# Patient Record
Sex: FEMALE | Race: WHITE | Employment: UNEMPLOYED | URBAN - METROPOLITAN AREA
[De-identification: names, ages, dates, MRNs, and addresses within clinical notes are randomized per-mention and may not be internally consistent; named-entity substitution may affect disease eponyms.]

---

## 2017-02-21 ENCOUNTER — OFFICE VISIT (OUTPATIENT)
Dept: PULMONOLOGY | Age: 77
End: 2017-02-21

## 2017-02-21 VITALS
WEIGHT: 147 LBS | OXYGEN SATURATION: 96 % | SYSTOLIC BLOOD PRESSURE: 124 MMHG | BODY MASS INDEX: 23.63 KG/M2 | DIASTOLIC BLOOD PRESSURE: 68 MMHG | RESPIRATION RATE: 18 BRPM | TEMPERATURE: 97.9 F | HEIGHT: 66 IN | HEART RATE: 79 BPM

## 2017-02-21 DIAGNOSIS — J45.40 ALLERGIC ASTHMA, MODERATE PERSISTENT, UNCOMPLICATED: ICD-10-CM

## 2017-02-21 DIAGNOSIS — J41.1 CHRONIC BRONCHITIS, MUCOPURULENT (HCC): Primary | ICD-10-CM

## 2017-02-21 DIAGNOSIS — K21.9 GASTROESOPHAGEAL REFLUX DISEASE WITHOUT ESOPHAGITIS: ICD-10-CM

## 2017-02-21 RX ORDER — ACETAMINOPHEN 325 MG/1
TABLET ORAL
COMMUNITY

## 2017-02-21 RX ORDER — AZITHROMYCIN 250 MG/1
250 TABLET, FILM COATED ORAL DAILY
Qty: 6 TAB | Refills: 0 | Status: SHIPPED | OUTPATIENT
Start: 2017-02-21 | End: 2017-02-26

## 2017-02-21 RX ORDER — PREDNISONE 10 MG/1
TABLET ORAL
Qty: 18 TAB | Refills: 0 | Status: SHIPPED | OUTPATIENT
Start: 2017-02-21 | End: 2017-03-23 | Stop reason: SINTOL

## 2017-02-21 RX ORDER — DIPHENHYDRAMINE HCL 25 MG
25 CAPSULE ORAL
COMMUNITY

## 2017-02-21 NOTE — PROGRESS NOTES
1/17 CT Chest  IMPRESSION:    1. No enlarged lymph nodes or other evidence of residual/recurrent lymphoma. 2. Few tiny right lower lobe pulmonary nodules, not visible previously, though possibly reflecting mucous plugs. Suggest 3 month followup chest CT to further evaluate and assess stability. 3. Generalized bronchial wall thickening, likely reflect bronchitis or asthma. 4. Septated hepatic cyst. Additional tiny hepatic lesions are too small to characterize, though were present previously and are not appreciably changed in size.

## 2017-02-21 NOTE — MR AVS SNAPSHOT
Visit Information Date & Time Provider Department Dept. Phone Encounter #  
 2/21/2017 11:30 AM Saundra Mary MD [facility] [id number] Follow-up Instructions Return in about 4 weeks (around 3/21/2017). Your Appointments 3/23/2017  9:45 AM  
Follow Up with Saundra Mary MD  
[address] ([place]) Appt Note: FROM 2/21/17  
 [address] [place] [address], [address] [place] [id number]  
[phone]  
  
   
 [address] [place] [address], [address] [facility] Formerly [facility] [address] Upcoming Health Maintenance Date Due DTaP/Tdap/Td series (1 - Tdap) 6/17/1959 ZOSTER VACCINE AGE 60> 6/17/1998 GLAUCOMA SCREENING Q2Y 6/17/2003 OSTEOPOROSIS SCREENING (DEXA) 6/17/2003 MEDICARE YEARLY EXAM 6/17/2003 Pneumococcal 65+ Low/Medium Risk (2 of 2 - PPSV23) 10/18/2017 Allergies as of 2/21/2017  Review Complete On: 2/21/2017 By: Saundra Mary MD  
  
 Severity Noted Reaction Type Reactions Influenza Virus Vaccine, Specific  02/26/2013    Unable to Obtain Morphine  02/26/2013    Unable to Obtain Current Immunizations  Never Reviewed No immunizations on file. Not reviewed this visit You Were Diagnosed With   
  
 Codes Comments Chronic bronchitis, mucopurulent ([facility] [address]    -  Primary ICD-10-CM: J41.1 ICD-9-CM: 491.1 Vitals BP Pulse Temp Resp Height(growth percentile) Weight(growth percentile) 124/68 (BP 1 Location: Left arm, BP Patient Position: At rest) 79 97.9 °F (36.6 °C) 18 5' 6\" (1.676 m) 147 lb (66.7 kg) LMP SpO2 BMI Smoking Status (Exact Date) 96% 23.73 kg/m2 Never Smoker Vitals History BMI and BSA Data Body Mass Index Body Surface Area  
 23.73 kg/m 2 1.76 m 2 Your Updated Medication List  
  
   
This list is accurate as of: 2/21/17 12:35 PM.  Always use your most recent med list.  
  
  
  
  
 ADVAIR DISKUS 100-50 mcg/dose diskus inhaler Generic drug:  fluticasone-salmeterol Take  by inhalation. azithromycin 250 mg tablet Commonly known as:  [place] Ping Take 1 Tab by mouth daily for 5 days. BENADRYL 25 mg capsule Generic drug:  diphenhydrAMINE Take 25 mg by mouth every six (6) hours as needed. COZAAR 100 mg tablet Generic drug:  losartan Take  by mouth. CRESTOR 10 mg tablet Generic drug:  rosuvastatin Take  by mouth. NexIUM 20 mg capsule Generic drug:  esomeprazole Take  by mouth. PAXIL 10 mg tablet Generic drug:  PARoxetine Take  by mouth.  
  
 predniSONE 10 mg tablet Commonly known as:  DELTASONE  
30 mg po dailyx 3 days,20 mg po daily x 3 days,10 mg po daily x 3 days PROVENTIL HFA 90 mcg/actuation inhaler Generic drug:  albuterol Take  by inhalation. SINGULAIR 10 mg tablet Generic drug:  montelukast  
Take  by mouth. SPIRIVA WITH HANDIHALER 18 mcg inhalation capsule Generic drug:  tiotropium TESSALON PERLES 100 mg capsule Generic drug:  benzonatate Take 100 mg by mouth three (3) times daily as needed for Cough. traMADol 50 mg tablet Commonly known as:  ULTRAM  
Take  by mouth. TYLENOL 325 mg tablet Generic drug:  acetaminophen Take  by mouth every four (4) hours as needed for Pain. Prescriptions Printed Refills  
 azithromycin (ZITHROMAX) 250 mg tablet 0 Sig: Take 1 Tab by mouth daily for 5 days. Class: Print Route: Oral  
 predniSONE (DELTASONE) 10 mg tablet 0 Si mg po dailyx 3 days,20 mg po daily x 3 days,10 mg po daily x 3 days Class: Print We Performed the Following SPUTUM CYTOLOGY K552141 CPT(R)] Follow-up Instructions Return in about 4 weeks (around 3/21/2017). To-Do List   
 2017 Microbiology:  AFB CULTURE + SMEAR W/RFLX ID FROM CULTURE   
  
 2017 Microbiology:  CULTURE, RESPIRATORY/SPUTUM/BRONCH W GRAM STAIN Introducing [facility]! Malgorzata Ty introduces xTurion patient portal. Now you can access parts of your medical record, email your doctor's office, and request medication refills online. 1. In your internet browser, go to [email or web address] 2. Click on the First Time User? Click Here link in the Sign In box. You will see the New Member Sign Up page. 3. Enter your xTurion Access Code exactly as it appears below. You will not need to use this code after youve completed the sign-up process. If you do not sign up before the expiration date, you must request a new code. · xTurion Access Code: [email or web address] Expires: 5/22/2017 12:23 PM 
 
4. Enter the last four digits of your Social Security Number (xxxx) and Date of Birth (mm/dd/yyyy) as indicated and click Submit. You will be taken to the next sign-up page. 5. Create a xTurion ID. This will be your xTurion login ID and cannot be changed, so think of one that is secure and easy to remember. 6. Create a xTurion password. You can change your password at any time. 7. Enter your Password Reset Question and Answer. This can be used at a later time if you forget your password. 8. Enter your e-mail address. You will receive e-mail notification when new information is available in [address] 9. Click Sign Up. You can now view and download portions of your medical record. 10. Click the Download Summary menu link to download a portable copy of your medical information. If you have questions, please visit the Frequently Asked Questions section of the xTurion website. Remember, xTurion is NOT to be used for urgent needs. For medical emergencies, dial 911. Now available from your iPhone and Android! Please provide this summary of care documentation to your next provider. Your primary care clinician is listed as Mik Araceli.  If you have any questions after today's visit, please call [phone].

## 2017-02-21 NOTE — LETTER
2017 1:45 PM 
 
Patient:  Ry Gatica YOB: 1940 Date of Visit: 2017 Dear Saurav Lilly MD 
[address] [facility] [address] [id number] [address] [place] [address] VIA Facsimile: [phone] 
 : 
 
 
Thank you for referring Ms. María Barahona to me for evaluation/treatment. Below are the relevant portions of my assessment and plan of care. [facility] Pulmonary, Critical Care, and Sleep Medicine Pulmonary Office visit Name: Ry Gatica : 1940 Date: 2017 Subjective:  
 
Patient is a 64 y.o. female who presents for evaluation of chronic cough 
 
17 Continues to have daily cough, productive of yellow thick mucus. Has post nasal drip. Takes benadryl at bedtime Denies any fever, chills. Ct chest done at [place] and has brought in the disc for review. Denies any other symptoms. Had blood work. HPI: 
Patient with history of Non-Hodgkin Lymphoma in . Recevied -16, Cytoxin, and Prednisone treatments initially Continues to be followed by Dr. Santi Fowler-Oncology continues to receive Rituxan every 6 months Patient states was ill a few weeks ago, received a solumedrol injection and symptoms have subsided Patient has c/o chronic cough for over a year Cough initially started as a dry cough, but now productive with yellow-green sputum Patient currently taking Tessalon pearls to help with sleep at night Patient has a history of seasonal allergies, and was receiving allergy shots for several years, but stopped due to lack of symptom improvement Using Advair, Singular, and Albuterol Denies fever, chills, hemoptysis Pt did have one episode of fever while on a cruise in 2016 Pt does have history of GERD and currently taking Nexium Pt uses walker for stability, has had a few falls in the past and had rib fractures Pt c/o joint pain, mainly in the knees 4 years ago she had presumed parainfluenza vial infection with with 6 months of slowly resolved cough  negative FOB 2009 .   
HHN and Spiriva added  - now on advair and prn albuterol She continue long term Rituxan for NHL  q 6 month Reported nl immunoglobulins in [place] ]   
??\" Wall of esophagus involved\" and swallow is not always normal?? 
 
 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  diphenhydrAMINE (BENADRYL) 25 mg capsule Take 25 mg by mouth every six (6) hours as needed.  albuterol (PROVENTIL HFA) 90 mcg/actuation inhaler Take  by inhalation.  esomeprazole (NEXIUM) 20 mg capsule Take  by mouth.  fluticasone-salmeterol (ADVAIR DISKUS) 100-50 mcg/dose diskus inhaler Take  by inhalation.  losartan (COZAAR) 100 mg tablet Take  by mouth.  montelukast (SINGULAIR) 10 mg tablet Take  by mouth.  rosuvastatin (CRESTOR) 10 mg tablet Take  by mouth.  traMADol (ULTRAM) 50 mg tablet Take  by mouth.  benzonatate (TESSALON PERLES) 100 mg capsule Take 100 mg by mouth three (3) times daily as needed for Cough.  PARoxetine (PAXIL) 10 mg tablet Take  by mouth.  tiotropium (SPIRIVA WITH HANDIHALER) 18 mcg inhalation capsule Review of Systems: 
HEENT: No epistaxis, no nasal drainage, no difficulty in swallowing, no redness in eyes Respiratory: as above Cardiovascular: no chest pain, no palpitations, no chronic leg edema, no syncope Gastrointestinal: no abd pain, no vomiting, no diarrhea, no bleeding symptoms Genitourinary: No urinary symptoms or hematuria Integument/breast: No ulcers or rashes Musculoskeletal:Neg 
Neurological: No focal weakness, no seizures, no headaches Behvioral/Psych: No anxiety, no depression Constitutional: No fever, no chills, no weight loss, no night sweats Objective:  
 
Visit Vitals  /68 (BP 1 Location: Left arm, BP Patient Position: At rest)  Pulse 79  Temp 97.9 °F (36.6 °C)  Resp 18  Ht 5' 6\" (1.676 m)  Wt 66.7 kg (147 lb)  LMP  (Exact Date)  SpO2 96%  BMI 23.73 kg/m2 Physical Exam:  
General: comfortable, no acute distress HEENT: pupils reactive, sclera anicteric, EOM intact Neck: No adenopathy or thyroid swelling, no lymphadenopathy or JVD, supple CVS: S1S2 no murmurs RS: Mod AE bilaterally, no tactile fremitus or egophony, no accessory muscle use Abd: soft, non tender, no hepatosplenomegaly Neuro: non focal, awake, alert Extrm: no leg edema, clubbing or cyanosis Skin: no rash Data review:  
 
Spirometry W/Bronchodilator (Pre/Post)2/26/2016 [employer] Component Name Value Ref Range FVC-Pre 2 L  
FVC-%Pred-Pre 67 % FEV1-Pre 1.43 L FEV1-%Pred-Pre 64 % FEV1-Post 1.77 L FEV1-%Change-Post 23 % FEV1FVC-Pre 72 % FEFMax-Pre 3.69 L/sec FEFMax-Post 5.57 L/sec FEFMax-%Change-Post 50 % [id number]-%Pred Pre 59 % MVV-Pre 35 L/min MVV-%Pred-Pre 41 % DLCOCOR-Pre 7.82 ml/min/mmHg DLCOCOR-Pred-Pre 29 % DLVA-%Pred-Pre 72 % Result Narrative Adequate tracing and effort.  The technician reported that the results of this tests do not meet standards for reproducibility in regards to DLCO.  Caution with interpretation.  Spirometry does meet criteria for acceptability and reproducibility.   
SPIROMETRY: No airflow obstruction by criteria, though there is scooping on the flow-volume loop which could represent obstructive disease.  The FEV1 and FVC are moderately reduced in a pattern consistent with a restrictive ventilatory abnormality.  There is a significant response to bronchodilator administration.   
DLCO: 
Severely decreased diffusing capacity.  The reduction in DLCO may be partially explained by the reduced lung volumes as measured by single breath VA determination and normal DL/VA ratio. CONCLUSIONS:  Spirometry with significant reversibility that normalizes lung volumes.  This is most consistent with asthma. Imaging: 
I have personally reviewed the patients radiographs and have reviewed the reports: 
CT scan chest- 1/2016 [facility]: CHEST: 
 
LUNGS: There are several very small pulmonary nodules bilaterally, similar to prior. No highly suspicious nodule. No other abnormal opacities. PLEURA: Normal, with no effusion or pneumothorax. AIRWAY: Scattered foci of bronchial mucoid impaction, most pronounced in the medial basal left lower lobe. MEDIASTINUM: Normal heart size. No pericardial fluid. Scattered coronary and aortic calcifications. LYMPH NODES: No enlarged lymph nodes. IMPRESSION:  
· Cough Variant Asthma- with mild intermittent well controlled symptoms. Her NHL and therapy can induced her cough /bronchits threshold but I see no evidence of atypical infections   
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 ? Atypical microbial pathogens, ? Allergic ( 9.8% eosinophilia on CBC · Abnormal PFT\"s with obstructive airways defect and reduced DLCO- ? Related to previous chemotherapy. · Non hodgkins lymphoma-,2000,Stage IV, CVP 11/14/2008 on maintenance Rituxiban · GERD 
    
  
RECOMMENDATIONS:  
· Continue Singulair , Advair and prn albuterol · Will check sputum culture, AFB and cytology · Empiric treatment with Prednisone taper and Zithromax pending cultures · Judicious cough suppressants · Discussed need for airway clearance · Discussed PFT findings- progressive declining DLCO 
 · Discussed CT findings- reassured · GERD control · Monitor for super infections ( on rituxiban) · Follow symptoms and aim for quality of life · Follow up in 4 weeks. Health maintenance screens deferred to Primary care provider. Mike Spring MD 
 
 
 
 
 
 
If you have questions, please do not hesitate to call me. I look forward to following Ms. Carlos along with you.  
 
 
 
Sincerely, 
 
 
Mike Spring MD

## 2017-02-21 NOTE — PROGRESS NOTES
[facility]  Pulmonary, Critical Care, and Sleep Medicine      Pulmonary Office visit    Name: Nita Huntley     : 1940     Date: 2017        Subjective:     Patient is a 64 y.o. female who presents for evaluation of chronic cough    17   Continues to have daily cough, productive of yellow thick mucus. Has post nasal drip. Takes benadryl at bedtime  Denies any fever, chills. Ct chest done at [facility] and has brought in the disc for review. Denies any other symptoms. Had blood work. HPI:  Patient with history of Non-Hodgkin Lymphoma in .  Recevied -16, Cytoxin, and Prednisone treatments initially   Continues to be followed by Dr. Nicol Fowler-Oncology continues to receive Rituxan every 6 months  Patient states was ill a few weeks ago, received a solumedrol injection and symptoms have subsided  Patient has c/o chronic cough for over a year  Cough initially started as a dry cough, but now productive with yellow-green sputum  Patient currently taking Tessalon pearls to help with sleep at night   Patient has a history of seasonal allergies, and was receiving allergy shots for several years, but stopped due to lack of symptom improvement  Using Advair, Singular, and Albuterol  Denies fever, chills, hemoptysis  Pt did have one episode of fever while on a cruise in 2016  Pt does have history of GERD and currently taking Nexium  Pt uses walker for stability, has had a few falls in the past and had rib fractures  Pt c/o joint pain, mainly in the knees  4 years ago she had presumed parainfluenza vial infection with with 6 months of slowly resolved cough  negative FOB  .    HHN and Spiriva added  - now on advair and prn albuterol  She continue long term Rituxan for NHL  q 6 month    Reported nl immunoglobulins in [place] ]    ??\" Wall of esophagus involved\" and swallow is not always normal??     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  diphenhydrAMINE (BENADRYL) 25 mg capsule Take 25 mg by mouth every six (6) hours as needed.  albuterol (PROVENTIL HFA) 90 mcg/actuation inhaler Take  by inhalation.  esomeprazole (NEXIUM) 20 mg capsule Take  by mouth.  fluticasone-salmeterol (ADVAIR DISKUS) 100-50 mcg/dose diskus inhaler Take  by inhalation.  losartan (COZAAR) 100 mg tablet Take  by mouth.  montelukast (SINGULAIR) 10 mg tablet Take  by mouth.  rosuvastatin (CRESTOR) 10 mg tablet Take  by mouth.  traMADol (ULTRAM) 50 mg tablet Take  by mouth.  benzonatate (TESSALON PERLES) 100 mg capsule Take 100 mg by mouth three (3) times daily as needed for Cough.  PARoxetine (PAXIL) 10 mg tablet Take  by mouth.       tiotropium (SPIRIVA WITH HANDIHALER) 18 mcg inhalation capsule            Review of Systems:  HEENT: No epistaxis, no nasal drainage, no difficulty in swallowing, no redness in eyes  Respiratory: as above  Cardiovascular: no chest pain, no palpitations, no chronic leg edema, no syncope  Gastrointestinal: no abd pain, no vomiting, no diarrhea, no bleeding symptoms  Genitourinary: No urinary symptoms or hematuria  Integument/breast: No ulcers or rashes  Musculoskeletal:Neg  Neurological: No focal weakness, no seizures, no headaches  Behvioral/Psych: No anxiety, no depression  Constitutional: No fever, no chills, no weight loss, no night sweats     Objective:     Visit Vitals    /68 (BP 1 Location: Left arm, BP Patient Position: At rest)    Pulse 79    Temp 97.9 °F (36.6 °C)    Resp 18    Ht 5' 6\" (1.676 m)    Wt 66.7 kg (147 lb)    LMP  (Exact Date)    SpO2 96%    BMI 23.73 kg/m2        Physical Exam:   General: comfortable, no acute distress  HEENT: pupils reactive, sclera anicteric, EOM intact  Neck: No adenopathy or thyroid swelling, no lymphadenopathy or JVD, supple  CVS: S1S2 no murmurs  RS: Mod AE bilaterally, no tactile fremitus or egophony, no accessory muscle use  Abd: soft, non tender, no hepatosplenomegaly  Neuro: non focal, awake, alert  Extrm: no leg edema, clubbing or cyanosis  Skin: no rash    Data review:     Spirometry W/Bronchodilator (Pre/Post)2/26/2016  [employer]  Component Name Value Ref Range   FVC-Pre 2 L   FVC-%Pred-Pre 67 %   FEV1-Pre 1.43 L   FEV1-%Pred-Pre 64 %   FEV1-Post 1.77 L   FEV1-%Change-Post 23 %   FEV1FVC-Pre 72 %   FEFMax-Pre 3.69 L/sec    FEFMax-Post 5.57 L/sec    FEFMax-%Change-Post 50 %   [id number]-%Pred Pre 59 %   MVV-Pre 35 L/min   MVV-%Pred-Pre 41 %   DLCOCOR-Pre 7.82 ml/min/mmHg    DLCOCOR-Pred-Pre 29 %   DLVA-%Pred-Pre 72 %   Result Narrative   Adequate tracing and effort. The technician reported that the results of this tests do not meet standards for reproducibility in regards to DLCO.  Caution with interpretation.  Spirometry does meet criteria for acceptability and reproducibility.    SPIROMETRY: No airflow obstruction by criteria, though there is scooping on the flow-volume loop which could represent obstructive disease.  The FEV1 and FVC are moderately reduced in a pattern consistent with a restrictive ventilatory abnormality.  There is a significant response to bronchodilator administration.    DLCO:  Severely decreased diffusing capacity.  The reduction in DLCO may be partially explained by the reduced lung volumes as measured by single breath VA determination and normal DL/VA ratio. CONCLUSIONS:  Spirometry with significant reversibility that normalizes lung volumes.  This is most consistent with asthma.        Imaging:  I have personally reviewed the patients radiographs and have reviewed the reports:  CT scan chest- 1/2016 [facility]:  CHEST:    LUNGS: There are several very small pulmonary nodules bilaterally, similar to prior. No highly suspicious nodule. No other abnormal opacities. PLEURA: Normal, with no effusion or pneumothorax. AIRWAY: Scattered foci of bronchial mucoid impaction, most pronounced in the medial basal left lower lobe. MEDIASTINUM: Normal heart size. No pericardial fluid. Scattered coronary and aortic calcifications. LYMPH NODES: No enlarged lymph nodes. IMPRESSION:   · Cough Variant Asthma- with mild intermittent well controlled symptoms. 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 ? Atypical microbial pathogens, ? Allergic ( 9.8% eosinophilia on CBC  · Abnormal PFT\"s with obstructive airways defect and reduced DLCO- ? Related to previous chemotherapy. · Non hodgkins lymphoma-,2000,Stage IV, CVP 11/14/2008 on maintenance Rituxiban  · GERD          RECOMMENDATIONS:   · Continue Singulair , Advair and prn albuterol  · Will check sputum culture, AFB and cytology  · Empiric treatment with Prednisone taper and Zithromax pending cultures  · Judicious cough suppressants  · Discussed need for airway clearance  · Discussed PFT findings- progressive declining DLCO  · Discussed CT findings- reassured  · GERD control  · Monitor for super infections ( on rituxiban)  · Follow symptoms and aim for quality of life  · Follow up in 4 weeks. Health maintenance screens deferred to Primary care provider.      Lucia Lees MD

## 2017-02-21 NOTE — COMMUNICATION BODY
[facility]  Pulmonary, Critical Care, and Sleep Medicine      Pulmonary Office visit    Name: Toby Richardson     : 1940     Date: 2017        Subjective:     Patient is a 64 y.o. female who presents for evaluation of chronic cough    17   Continues to have daily cough, productive of yellow thick mucus. Has post nasal drip. Takes benadryl at bedtime  Denies any fever, chills. Ct chest done at [facility] and has brought in the disc for review. Denies any other symptoms. Had blood work. HPI:  Patient with history of Non-Hodgkin Lymphoma in .  Recevied -16, Cytoxin, and Prednisone treatments initially   Continues to be followed by Dr. Charli Fowler-Oncology continues to receive Rituxan every 6 months  Patient states was ill a few weeks ago, received a solumedrol injection and symptoms have subsided  Patient has c/o chronic cough for over a year  Cough initially started as a dry cough, but now productive with yellow-green sputum  Patient currently taking Tessalon pearls to help with sleep at night   Patient has a history of seasonal allergies, and was receiving allergy shots for several years, but stopped due to lack of symptom improvement  Using Advair, Singular, and Albuterol  Denies fever, chills, hemoptysis  Pt did have one episode of fever while on a cruise in 2016  Pt does have history of GERD and currently taking Nexium  Pt uses walker for stability, has had a few falls in the past and had rib fractures  Pt c/o joint pain, mainly in the knees  4 years ago she had presumed parainfluenza vial infection with with 6 months of slowly resolved cough  negative FOB  .    HHN and Spiriva added  - now on advair and prn albuterol  She continue long term Rituxan for NHL  q 6 month    Reported nl immunoglobulins in [place] ]    ??\" Wall of esophagus involved\" and swallow is not always normal??     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  diphenhydrAMINE (BENADRYL) 25 mg capsule Take 25 mg by mouth every six (6) hours as needed.  albuterol (PROVENTIL HFA) 90 mcg/actuation inhaler Take  by inhalation.  esomeprazole (NEXIUM) 20 mg capsule Take  by mouth.  fluticasone-salmeterol (ADVAIR DISKUS) 100-50 mcg/dose diskus inhaler Take  by inhalation.  losartan (COZAAR) 100 mg tablet Take  by mouth.  montelukast (SINGULAIR) 10 mg tablet Take  by mouth.  rosuvastatin (CRESTOR) 10 mg tablet Take  by mouth.  traMADol (ULTRAM) 50 mg tablet Take  by mouth.  benzonatate (TESSALON PERLES) 100 mg capsule Take 100 mg by mouth three (3) times daily as needed for Cough.  PARoxetine (PAXIL) 10 mg tablet Take  by mouth.       tiotropium (SPIRIVA WITH HANDIHALER) 18 mcg inhalation capsule            Review of Systems:  HEENT: No epistaxis, no nasal drainage, no difficulty in swallowing, no redness in eyes  Respiratory: as above  Cardiovascular: no chest pain, no palpitations, no chronic leg edema, no syncope  Gastrointestinal: no abd pain, no vomiting, no diarrhea, no bleeding symptoms  Genitourinary: No urinary symptoms or hematuria  Integument/breast: No ulcers or rashes  Musculoskeletal:Neg  Neurological: No focal weakness, no seizures, no headaches  Behvioral/Psych: No anxiety, no depression  Constitutional: No fever, no chills, no weight loss, no night sweats     Objective:     Visit Vitals    /68 (BP 1 Location: Left arm, BP Patient Position: At rest)    Pulse 79    Temp 97.9 °F (36.6 °C)    Resp 18    Ht 5' 6\" (1.676 m)    Wt 66.7 kg (147 lb)    LMP  (Exact Date)    SpO2 96%    BMI 23.73 kg/m2        Physical Exam:   General: comfortable, no acute distress  HEENT: pupils reactive, sclera anicteric, EOM intact  Neck: No adenopathy or thyroid swelling, no lymphadenopathy or JVD, supple  CVS: S1S2 no murmurs  RS: Mod AE bilaterally, no tactile fremitus or egophony, no accessory muscle use  Abd: soft, non tender, no hepatosplenomegaly  Neuro: non focal, awake, alert  Extrm: no leg edema, clubbing or cyanosis  Skin: no rash    Data review:     Spirometry W/Bronchodilator (Pre/Post)2/26/2016  [employer]  Component Name Value Ref Range   FVC-Pre 2 L   FVC-%Pred-Pre 67 %   FEV1-Pre 1.43 L   FEV1-%Pred-Pre 64 %   FEV1-Post 1.77 L   FEV1-%Change-Post 23 %   FEV1FVC-Pre 72 %   FEFMax-Pre 3.69 L/sec    FEFMax-Post 5.57 L/sec    FEFMax-%Change-Post 50 %   BBE6570-%Pred Pre 59 %   MVV-Pre 35 L/min   MVV-%Pred-Pre 41 %   DLCOCOR-Pre 7.82 ml/min/mmHg    DLCOCOR-Pred-Pre 29 %   DLVA-%Pred-Pre 72 %   Result Narrative   Adequate tracing and effort. The technician reported that the results of this tests do not meet standards for reproducibility in regards to DLCO.  Caution with interpretation.  Spirometry does meet criteria for acceptability and reproducibility.    SPIROMETRY: No airflow obstruction by criteria, though there is scooping on the flow-volume loop which could represent obstructive disease.  The FEV1 and FVC are moderately reduced in a pattern consistent with a restrictive ventilatory abnormality.  There is a significant response to bronchodilator administration.    DLCO:  Severely decreased diffusing capacity.  The reduction in DLCO may be partially explained by the reduced lung volumes as measured by single breath VA determination and normal DL/VA ratio. CONCLUSIONS:  Spirometry with significant reversibility that normalizes lung volumes.  This is most consistent with asthma.        Imaging:  I have personally reviewed the patients radiographs and have reviewed the reports:  CT scan chest- 1/2016 [facility]:  CHEST:    LUNGS: There are several very small pulmonary nodules bilaterally, similar to prior. No highly suspicious nodule. No other abnormal opacities. PLEURA: Normal, with no effusion or pneumothorax. AIRWAY: Scattered foci of bronchial mucoid impaction, most pronounced in the medial basal left lower lobe. MEDIASTINUM: Normal heart size. No pericardial fluid. Scattered coronary and aortic calcifications. LYMPH NODES: No enlarged lymph nodes. IMPRESSION:   · Cough Variant Asthma- with mild intermittent well controlled symptoms. 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 ? Atypical microbial pathogens, ? Allergic ( 9.8% eosinophilia on CBC  · Abnormal PFT\"s with obstructive airways defect and reduced DLCO- ? Related to previous chemotherapy. · Non hodgkins lymphoma-,2000,Stage IV, CVP 11/14/2008 on maintenance Rituxiban  · GERD          RECOMMENDATIONS:   · Continue Singulair , Advair and prn albuterol  · Will check sputum culture, AFB and cytology  · Empiric treatment with Prednisone taper and Zithromax pending cultures  · Judicious cough suppressants  · Discussed need for airway clearance  · Discussed PFT findings- progressive declining DLCO  · Discussed CT findings- reassured  · GERD control  · Monitor for super infections ( on rituxiban)  · Follow symptoms and aim for quality of life  · Follow up in 4 weeks. Health maintenance screens deferred to Primary care provider.      Lucia Lees MD

## 2017-02-28 ENCOUNTER — HOSPITAL ENCOUNTER (OUTPATIENT)
Dept: LAB | Age: 77
Discharge: HOME | End: 2017-02-28
Payer: MEDICARE

## 2017-02-28 PROCEDURE — 87070 CULTURE OTHR SPECIMN AEROBIC: CPT | Performed by: INTERNAL MEDICINE

## 2017-02-28 PROCEDURE — 87116 MYCOBACTERIA CULTURE: CPT | Performed by: INTERNAL MEDICINE

## 2017-03-01 ENCOUNTER — HOSPITAL ENCOUNTER (OUTPATIENT)
Dept: LAB | Age: 77
Discharge: HOME | End: 2017-03-01
Payer: MEDICARE

## 2017-03-01 PROCEDURE — 87070 CULTURE OTHR SPECIMN AEROBIC: CPT | Performed by: INTERNAL MEDICINE

## 2017-03-01 PROCEDURE — 87116 MYCOBACTERIA CULTURE: CPT | Performed by: INTERNAL MEDICINE

## 2017-03-01 PROCEDURE — 36415 COLL VENOUS BLD VENIPUNCTURE: CPT | Performed by: INTERNAL MEDICINE

## 2017-03-02 LAB
BACTERIA SPEC CULT: NORMAL
GRAM STN SPEC: NORMAL
SERVICE CMNT-IMP: NORMAL

## 2017-03-03 ENCOUNTER — HOSPITAL ENCOUNTER (OUTPATIENT)
Dept: LAB | Age: 77
Discharge: HOME | End: 2017-03-03
Payer: MEDICARE

## 2017-03-03 LAB
BACTERIA SPEC CULT: NORMAL
GRAM STN SPEC: NORMAL
SERVICE CMNT-IMP: NORMAL

## 2017-03-03 PROCEDURE — 36415 COLL VENOUS BLD VENIPUNCTURE: CPT | Performed by: INTERNAL MEDICINE

## 2017-03-03 PROCEDURE — 87116 MYCOBACTERIA CULTURE: CPT | Performed by: INTERNAL MEDICINE

## 2017-03-03 PROCEDURE — 87070 CULTURE OTHR SPECIMN AEROBIC: CPT | Performed by: INTERNAL MEDICINE

## 2017-03-05 LAB
BACTERIA SPEC CULT: NORMAL
GRAM STN SPEC: NORMAL
SERVICE CMNT-IMP: NORMAL

## 2017-03-23 ENCOUNTER — OFFICE VISIT (OUTPATIENT)
Dept: PULMONOLOGY | Age: 77
End: 2017-03-23

## 2017-03-23 VITALS
OXYGEN SATURATION: 96 % | HEIGHT: 66 IN | DIASTOLIC BLOOD PRESSURE: 70 MMHG | BODY MASS INDEX: 23.3 KG/M2 | WEIGHT: 145 LBS | HEART RATE: 94 BPM | RESPIRATION RATE: 20 BRPM | TEMPERATURE: 98.6 F | SYSTOLIC BLOOD PRESSURE: 112 MMHG

## 2017-03-23 DIAGNOSIS — J41.1 CHRONIC BRONCHITIS, MUCOPURULENT (HCC): ICD-10-CM

## 2017-03-23 DIAGNOSIS — C85.98 NON-HODGKIN LYMPHOMA OF LYMPH NODES OF MULTIPLE REGIONS, UNSPECIFIED NON-HODGKIN LYMPHOMA TYPE (HCC): ICD-10-CM

## 2017-03-23 DIAGNOSIS — J45.41 MODERATE PERSISTENT ASTHMA WITH ACUTE EXACERBATION: Primary | ICD-10-CM

## 2017-03-23 DIAGNOSIS — R05.3 PERSISTENT COUGH FOR 3 WEEKS OR LONGER: ICD-10-CM

## 2017-03-23 RX ORDER — AZITHROMYCIN 250 MG/1
250 TABLET, FILM COATED ORAL DAILY
Qty: 6 TAB | Refills: 0 | Status: SHIPPED | OUTPATIENT
Start: 2017-03-23 | End: 2017-06-21

## 2017-03-23 RX ORDER — FLUTICASONE PROPIONATE AND SALMETEROL 500; 50 UG/1; UG/1
1 POWDER RESPIRATORY (INHALATION) 2 TIMES DAILY
Qty: 1 INHALER | Refills: 3 | Status: SHIPPED | OUTPATIENT
Start: 2017-03-23 | End: 2018-03-08 | Stop reason: SDUPTHER

## 2017-03-23 RX ORDER — AZITHROMYCIN 250 MG/1
250 TABLET, FILM COATED ORAL DAILY
Qty: 6 TAB | Refills: 0 | Status: SHIPPED | OUTPATIENT
Start: 2017-03-23 | End: 2017-03-28

## 2017-03-23 RX ORDER — FLUTICASONE PROPIONATE 50 MCG
SPRAY, SUSPENSION (ML) NASAL
Qty: 1 BOTTLE | Refills: 3 | Status: SHIPPED | OUTPATIENT
Start: 2017-03-23

## 2017-03-23 NOTE — PROGRESS NOTES
[facility]  Pulmonary, Critical Care, and Sleep Medicine      Pulmonary Office visit    Name: Gem Dover     : 1940     Date: 3/23/2017        Subjective:     Patient is a 64 y.o. female who presents for evaluation of chronic cough    17   Continues to have daily cough, productive of yellow thick mucus. Has post nasal drip. Takes benadryl at bedtime  Prednisone and antibiotic helped some but could not tolerate prednisone- GI side effects. Denies any fever, chills. Submitted 3 sputum specimens for AFB and culture after last visit  Denies any other symptoms       HPI:  Patient with history of Non-Hodgkin Lymphoma in .  Recevied -16, Cytoxin, and Prednisone treatments initially   Continues to be followed by Dr. Ector Fowler-Oncology continues to receive Rituxan every 6 months  Patient states was ill a few weeks ago, received a solumedrol injection and symptoms have subsided  Patient has c/o chronic cough for over a year  Cough initially started as a dry cough, but now productive with yellow-green sputum  Patient currently taking Tessalon pearls to help with sleep at night   Patient has a history of seasonal allergies, and was receiving allergy shots for several years, but stopped due to lack of symptom improvement  Using Advair, Singular, and Albuterol  Denies fever, chills, hemoptysis  Pt did have one episode of fever while on a cruise in 2016  Pt does have history of GERD and currently taking Nexium  Pt uses walker for stability, has had a few falls in the past and had rib fractures  Pt c/o joint pain, mainly in the knees  4 years ago she had presumed parainfluenza vial infection with with 6 months of slowly resolved cough  negative FOB  .    HHN and Spiriva added  - now on advair and prn albuterol  She continue long term Rituxan for NHL  q 6 month    Reported nl immunoglobulins in [place] ]    ??\" Wall of esophagus involved\" and swallow is not always normal??     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 Of swelling    Morphine Unable to Obtain     Current Outpatient Prescriptions   Medication Sig Dispense Refill    acetaminophen (TYLENOL) 325 mg tablet Take  by mouth every four (4) hours as needed for Pain.  diphenhydrAMINE (BENADRYL) 25 mg capsule Take 25 mg by mouth every six (6) hours as needed.  albuterol (PROVENTIL HFA) 90 mcg/actuation inhaler Take  by inhalation.  esomeprazole (NEXIUM) 20 mg capsule Take  by mouth.  fluticasone-salmeterol (ADVAIR DISKUS) 100-50 mcg/dose diskus inhaler Take  by inhalation.  losartan (COZAAR) 100 mg tablet Take  by mouth.  montelukast (SINGULAIR) 10 mg tablet Take  by mouth.  rosuvastatin (CRESTOR) 10 mg tablet Take  by mouth.  traMADol (ULTRAM) 50 mg tablet Take  by mouth.  predniSONE (DELTASONE) 10 mg tablet 30 mg po dailyx 3 days,20 mg po daily x 3 days,10 mg po daily x 3 days 18 Tab 0    benzonatate (TESSALON PERLES) 100 mg capsule Take 100 mg by mouth three (3) times daily as needed for Cough.  PARoxetine (PAXIL) 10 mg tablet Take  by mouth.       tiotropium (SPIRIVA WITH HANDIHALER) 18 mcg inhalation capsule            Review of Systems:  HEENT: No epistaxis, no nasal drainage, no difficulty in swallowing, no redness in eyes  Respiratory: as above  Cardiovascular: no chest pain, no palpitations, no chronic leg edema, no syncope  Gastrointestinal: no abd pain, no vomiting, no diarrhea, no bleeding symptoms  Genitourinary: No urinary symptoms or hematuria  Integument/breast: No ulcers or rashes  Musculoskeletal:Neg  Neurological: No focal weakness, no seizures, no headaches  Behvioral/Psych: No anxiety, no depression  Constitutional: No fever, no chills, no weight loss, no night sweats     Objective:     Visit Vitals    /70 (BP 1 Location: Left arm, BP Patient Position: Sitting)    Pulse 94    Temp 98.6 °F (37 °C)    Resp 20    Ht 5' 6\" (1.676 m)    Wt 65.8 kg (145 lb)    LMP 07/23/1988    SpO2 96%    BMI 23.4 kg/m2        Physical Exam:   General: comfortable, no acute distress  HEENT: pupils reactive, sclera anicteric, EOM intact  Neck: No adenopathy or thyroid swelling, no lymphadenopathy or JVD, supple  CVS: S1S2 no murmurs  RS: Mod AE bilaterally, no tactile fremitus or egophony, no accessory muscle use  Abd: soft, non tender, no hepatosplenomegaly  Neuro: non focal, awake, alert  Extrm: no leg edema, clubbing or cyanosis  Skin: no rash    Data review:     Spirometry W/Bronchodilator (Pre/Post)2/26/2016  [facility]  Component Name Value Ref Range   FVC-Pre 2 L   FVC-%Pred-Pre 67 %   FEV1-Pre 1.43 L   FEV1-%Pred-Pre 64 %   FEV1-Post 1.77 L   FEV1-%Change-Post 23 %   FEV1FVC-Pre 72 %   FEFMax-Pre 3.69 L/sec    FEFMax-Post 5.57 L/sec    FEFMax-%Change-Post 50 %   DAC1700-%Pred Pre 59 %   MVV-Pre 35 L/min   MVV-%Pred-Pre 41 %   DLCOCOR-Pre 7.82 ml/min/mmHg    DLCOCOR-Pred-Pre 29 %   DLVA-%Pred-Pre 72 %   Result Narrative   Adequate tracing and effort.  The technician reported that the results of this tests do not meet standards for reproducibility in regards to DLCO.  Caution with interpretation.  Spirometry does meet criteria for acceptability and reproducibility.    SPIROMETRY: No airflow obstruction by criteria, though there is scooping on the flow-volume loop which could represent obstructive disease.  The FEV1 and FVC are moderately reduced in a pattern consistent with a restrictive ventilatory abnormality.  There is a significant response to bronchodilator administration.    DLCO:  Severely decreased diffusing capacity.  The reduction in DLCO may be partially explained by the reduced lung volumes as measured by single breath VA determination and normal DL/VA ratio. CONCLUSIONS:  Spirometry with significant reversibility that normalizes lung volumes.  This is most consistent with asthma. Imaging:  I have personally reviewed the patients radiographs and have reviewed the reports:  CT scan chest- 1/2016 [facility]:  CHEST:    LUNGS: There are several very small pulmonary nodules bilaterally, similar to prior. No highly suspicious nodule. No other abnormal opacities. PLEURA: Normal, with no effusion or pneumothorax. AIRWAY: Scattered foci of bronchial mucoid impaction, most pronounced in the medial basal left lower lobe. MEDIASTINUM: Normal heart size. No pericardial fluid. Scattered coronary and aortic calcifications. LYMPH NODES: No enlarged lymph nodes. IMPRESSION:   · Cough Variant Asthma- with mild intermittent well controlled symptoms. Her NHL and therapy can induced her cough /bronchits thresh old but I see no evidence of atypical infections  . 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 , ? Allergic ( 9.8% eosinophilia on CBC)  · Abnormal PFT\"s with obstructive airways defect and reduced DLCO- ? Related to previous chemotherapy.   · Non hodgkins lymphoma-,2000,Stage IV, CVP 11/14/2008 on maintenance Rituxiban  · GERD          RECOMMENDATIONS:   · Continue Singulair , Advair - will increase to 500/50 and prn albuterol  · Zithromax X 3 months as steroid sparing antiinflammatory agent  · Judicious cough suppressants  · Discussed need for airway clearance  · Discussed PFT findings- progressive declining DLCO- will monitor  · Discussed CT findings-and negative sputum AFB findings and reassured  · GERD control  · Monitor for super infections ( on rituxiban)  · Follow symptoms and aim for quality of life  · Follow up in 6 weeks. Health maintenance screens deferred to Primary care provider.      Stacie Meredith MD

## 2017-03-23 NOTE — MR AVS SNAPSHOT
Visit Information Date & Time Provider Department Dept. Phone Encounter #  
 3/23/2017  9:45 AM Ger Ma MD [facility] [place]  Follow-up Instructions Return in about 6 weeks (around 5/4/2017). Upcoming Health Maintenance Date Due DTaP/Tdap/Td series (1 - Tdap) 6/17/1961 ZOSTER VACCINE AGE 60> 6/17/1998 GLAUCOMA SCREENING Q2Y 6/17/2003 OSTEOPOROSIS SCREENING (DEXA) 6/17/2003 MEDICARE YEARLY EXAM 6/17/2003 Pneumococcal 65+ Low/Medium Risk (2 of 2 - PPSV23) 10/18/2017 Allergies as of 3/23/2017  Review Complete On: 3/23/2017 By: Ger Ma MD  
  
 Severity Noted Reaction Type Reactions Influenza Virus Vaccine, Specific Medium 10/23/2016   Systemic Swelling, Other (comments) Reddened right arm and large amt. Of swelling Morphine  02/26/2013    Unable to Obtain Current Immunizations  Never Reviewed Name Date Influenza High Dose Vaccine PF 10/23/2014 Pneumococcal Conjugate (PCV-13) 11/23/2015 Not reviewed this visit You Were Diagnosed With   
  
 Codes Comments Cough productive of clear sputum    -  Primary ICD-10-CM: R05 ICD-9-CM: 786.2 Moderate persistent asthma with acute exacerbation     ICD-10-CM: J45.41 
ICD-9-CM: 493.92 Vitals BP Pulse Temp Resp Height(growth percentile) Weight(growth percentile) 112/70 (BP 1 Location: Left arm, BP Patient Position: Sitting) 94 98.6 °F (37 °C) 20 5' 6\" (1.676 m) 145 lb (65.8 kg) LMP SpO2 BMI OB Status Smoking Status 07/23/1988 96% 23.4 kg/m2 Hysterectomy Never Smoker BMI and BSA Data Body Mass Index Body Surface Area  
 23.4 kg/m 2 1.75 m 2 Preferred Pharmacy Pharmacy Name Phone [address] [facility] [phone] Your Updated Medication List  
  
   
This list is accurate as of: 3/23/17 10:22 AM.  Always use your most recent med list.  
  
  
  
  
 * ADVAIR DISKUS 100-50 mcg/dose diskus inhaler Generic drug:  fluticasone-salmeterol Take  by inhalation. * fluticasone-salmeterol 500-50 mcg/dose diskus inhaler Commonly known as:  ADVAIR Take 1 Puff by inhalation two (2) times a day. * azithromycin 250 mg tablet Commonly known as:  Bang Listen Take 1 Tab by mouth daily for 5 days. * azithromycin 250 mg tablet Commonly known as:  Bang Listen Take 1 Tab by mouth daily for 90 days. BENADRYL 25 mg capsule Generic drug:  diphenhydrAMINE Take 25 mg by mouth every six (6) hours as needed. COZAAR 100 mg tablet Generic drug:  losartan Take  by mouth. CRESTOR 10 mg tablet Generic drug:  rosuvastatin Take  by mouth. fluticasone 50 mcg/actuation nasal spray Commonly known as:  FLONASE  
2 squirts each nostril HS NexIUM 20 mg capsule Generic drug:  esomeprazole Take  by mouth. PAXIL 10 mg tablet Generic drug:  PARoxetine Take  by mouth. PROVENTIL HFA 90 mcg/actuation inhaler Generic drug:  albuterol Take  by inhalation. SINGULAIR 10 mg tablet Generic drug:  montelukast  
Take  by mouth. SPIRIVA WITH HANDIHALER 18 mcg inhalation capsule Generic drug:  tiotropium TESSALON PERLES 100 mg capsule Generic drug:  benzonatate Take 100 mg by mouth three (3) times daily as needed for Cough. traMADol 50 mg tablet Commonly known as:  ULTRAM  
Take  by mouth. TYLENOL 325 mg tablet Generic drug:  acetaminophen Take  by mouth every four (4) hours as needed for Pain. * Notice: This list has 4 medication(s) that are the same as other medications prescribed for you. Read the directions carefully, and ask your doctor or other care provider to review them with you. Prescriptions Printed Refills  
 azithromycin (ZITHROMAX) 250 mg tablet 0 Sig: Take 1 Tab by mouth daily for 5 days. Class: Print  Route: Oral  
 fluticasone-salmeterol (ADVAIR) 500-50 mcg/dose diskus inhaler 3 Sig: Take 1 Puff by inhalation two (2) times a day. Class: Print Route: Inhalation  
 fluticasone (FLONASE) 50 mcg/actuation nasal spray 3 Si squirts each nostril HS Class: Print  
 azithromycin (ZITHROMAX) 250 mg tablet 0 Sig: Take 1 Tab by mouth daily for 90 days. Class: Print Route: Oral  
  
Follow-up Instructions Return in about 6 weeks (around 2017). Introducing [facility]! Jennifer Ahmadinorma introduces [employer] patient portal. Now you can access parts of your medical record, email your doctor's office, and request medication refills online. 1. In your internet browser, go to [email or web address] 2. Click on the First Time User? Click Here link in the Sign In box. You will see the New Member Sign Up page. 3. Enter your [employer] Access Code exactly as it appears below. You will not need to use this code after youve completed the sign-up process. If you do not sign up before the expiration date, you must request a new code. · [employer] Access Code: [email or web address] Expires: 2017  1:23 PM 
 
4. Enter the last four digits of your Social Security Number (xxxx) and Date of Birth (mm/dd/yyyy) as indicated and click Submit. You will be taken to the next sign-up page. 5. Create a [employer] ID. This will be your [employer] login ID and cannot be changed, so think of one that is secure and easy to remember. 6. Create a [employer] password. You can change your password at any time. 7. Enter your Password Reset Question and Answer. This can be used at a later time if you forget your password. 8. Enter your e-mail address. You will receive e-mail notification when new information is available in [address]. 9. Click Sign Up. You can now view and download portions of your medical record. 10. Click the Download Summary menu link to download a portable copy of your medical information. If you have questions, please visit the Frequently Asked Questions section of the [employer] website. Remember, [employer] is NOT to be used for urgent needs. For medical emergencies, dial 911. Now available from your iPhone and Android! Please provide this summary of care documentation to your next provider. Your primary care clinician is listed as Agnes Mckenna. If you have any questions after today's visit, please call [phone].

## 2017-03-23 NOTE — PROGRESS NOTES
The pt. States she is having allergy symptoms. Chest congestion with prod. Cough of sm. Amts. Yellow green sputum.

## 2017-03-23 NOTE — LETTER
3/24/2017 3:30 AM 
 
Patient:  Janine Madrigal YOB: 1940 Date of Visit: 3/23/2017 Dear Dl Jarquin MD 
[address] [facility] [address] [id number] [place] [address] VIA Facsimile: [phone] 
 : 
 
 
Thank you for referring Ms. Bert Ly to me for evaluation/treatment. Below are the relevant portions of my assessment and plan of care. [facility] Pulmonary, Critical Care, and Sleep Medicine Pulmonary Office visit Name: Janine Madrigal : 1940 Date: 3/23/2017 Subjective:  
 
Patient is a 64 y.o. female who presents for evaluation of chronic cough 17 Continues to have daily cough, productive of yellow thick mucus. Has post nasal drip. Takes benadryl at bedtime Prednisone and antibiotic helped some but could not tolerate prednisone- GI side effects. Denies any fever, chills. Submitted 3 sputum specimens for AFB and culture after last visit Denies any other symptoms HPI: 
Patient with history of Non-Hodgkin Lymphoma in . Recevied -16, Cytoxin, and Prednisone treatments initially Continues to be followed by Dr. Jayna Fowler-Oncology continues to receive Rituxan every 6 months Patient states was ill a few weeks ago, received a solumedrol injection and symptoms have subsided Patient has c/o chronic cough for over a year Cough initially started as a dry cough, but now productive with yellow-green sputum Patient currently taking Tessalon pearls to help with sleep at night Patient has a history of seasonal allergies, and was receiving allergy shots for several years, but stopped due to lack of symptom improvement Using Advair, Singular, and Albuterol Denies fever, chills, hemoptysis Pt did have one episode of fever while on a cruise in 2016 Pt does have history of GERD and currently taking Nexium Pt uses walker for stability, has had a few falls in the past and had rib fractures Pt c/o joint pain, mainly in the knees 4 years ago she had presumed parainfluenza vial infection with with 6 months of slowly resolved cough  negative FOB 2009 .   
HHN and Spiriva added  - now on advair and prn albuterol She continue long term Rituxan for NHL  q 6 month Reported nl immunoglobulins in [place] ]   
??\" Wall of esophagus involved\" and swallow is not always normal?? 
 
 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 Of swelling  Morphine Unable to Obtain Current Outpatient Prescriptions Medication Sig Dispense Refill  acetaminophen (TYLENOL) 325 mg tablet Take  by mouth every four (4) hours as needed for Pain.  diphenhydrAMINE (BENADRYL) 25 mg capsule Take 25 mg by mouth every six (6) hours as needed.  albuterol (PROVENTIL HFA) 90 mcg/actuation inhaler Take  by inhalation.  esomeprazole (NEXIUM) 20 mg capsule Take  by mouth.  fluticasone-salmeterol (ADVAIR DISKUS) 100-50 mcg/dose diskus inhaler Take  by inhalation.  losartan (COZAAR) 100 mg tablet Take  by mouth.  montelukast (SINGULAIR) 10 mg tablet Take  by mouth.  rosuvastatin (CRESTOR) 10 mg tablet Take  by mouth.  traMADol (ULTRAM) 50 mg tablet Take  by mouth.  predniSONE (DELTASONE) 10 mg tablet 30 mg po dailyx 3 days,20 mg po daily x 3 days,10 mg po daily x 3 days 18 Tab 0  
 benzonatate (TESSALON PERLES) 100 mg capsule Take 100 mg by mouth three (3) times daily as needed for Cough.  PARoxetine (PAXIL) 10 mg tablet Take  by mouth.  tiotropium (SPIRIVA WITH HANDIHALER) 18 mcg inhalation capsule Review of Systems: 
HEENT: No epistaxis, no nasal drainage, no difficulty in swallowing, no redness in eyes Respiratory: as above Cardiovascular: no chest pain, no palpitations, no chronic leg edema, no syncope Gastrointestinal: no abd pain, no vomiting, no diarrhea, no bleeding symptoms Genitourinary: No urinary symptoms or hematuria Integument/breast: No ulcers or rashes Musculoskeletal:Neg 
Neurological: No focal weakness, no seizures, no headaches Behvioral/Psych: No anxiety, no depression Constitutional: No fever, no chills, no weight loss, no night sweats Objective:  
 
Visit Vitals  /70 (BP 1 Location: Left arm, BP Patient Position: Sitting)  Pulse 94  Temp 98.6 °F (37 °C)  Resp 20  
 Ht 5' 6\" (1.676 m)  Wt 65.8 kg (145 lb)  LMP 07/23/1988  SpO2 96%  BMI 23.4 kg/m2 Physical Exam:  
General: comfortable, no acute distress HEENT: pupils reactive, sclera anicteric, EOM intact Neck: No adenopathy or thyroid swelling, no lymphadenopathy or JVD, supple CVS: S1S2 no murmurs RS: Mod AE bilaterally, no tactile fremitus or egophony, no accessory muscle use Abd: soft, non tender, no hepatosplenomegaly Neuro: non focal, awake, alert Extrm: no leg edema, clubbing or cyanosis Skin: no rash Data review:  
 
Spirometry W/Bronchodilator (Pre/Post)2/26/2016 [facility] Component Name Value Ref Range FVC-Pre 2 L  
FVC-%Pred-Pre 67 % FEV1-Pre 1.43 L FEV1-%Pred-Pre 64 % FEV1-Post 1.77 L FEV1-%Change-Post 23 % FEV1FVC-Pre 72 % FEFMax-Pre 3.69 L/sec FEFMax-Post 5.57 L/sec FEFMax-%Change-Post 50 % [id number]-%Pred Pre 59 % MVV-Pre 35 L/min MVV-%Pred-Pre 41 % DLCOCOR-Pre 7.82 ml/min/mmHg DLCOCOR-Pred-Pre 29 % DLVA-%Pred-Pre 72 % Result Narrative Adequate tracing and effort. The technician reported that the results of this tests do not meet standards for reproducibility in regards to [facility] with interpretation.  Spirometry does meet criteria for acceptability and reproducibility.    
 SPIROMETRY: No airflow obstruction by criteria, though there is scooping on the flow-volume loop which could represent obstructive disease.  The FEV1 and FVC are moderately reduced in a pattern consistent with a restrictive ventilatory abnormality.  There is a significant response to bronchodilator administration.   
DLCO: 
Severely decreased diffusing capacity.  The reduction in DLCO may be partially explained by the reduced lung volumes as measured by single breath VA determination and normal DL/VA ratio. CONCLUSIONS:  Spirometry with significant reversibility that normalizes lung volumes.  This is most consistent with asthma. Imaging: 
I have personally reviewed the patients radiographs and have reviewed the reports: 
CT scan chest- 1/2016 [facility]: CHEST: 
 
LUNGS: There are several very small pulmonary nodules bilaterally, similar to prior. No highly suspicious nodule. No other abnormal opacities. PLEURA: Normal, with no effusion or pneumothorax. AIRWAY: Scattered foci of bronchial mucoid impaction, most pronounced in the medial basal left lower lobe. MEDIASTINUM: Normal heart size. No pericardial fluid. Scattered coronary and aortic calcifications. LYMPH NODES: No enlarged lymph nodes. IMPRESSION:  
· Cough Variant Asthma- with mild intermittent well controlled symptoms. Her NHL and therapy can induced her cough /bronchits thresh old but I see no evidence of atypical infections  . 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 , ? Allergic ( 9.8% eosinophilia on CBC) · Abnormal PFT\"s with obstructive airways defect and reduced DLCO- ? Related to previous chemotherapy. · Non hodgkins lymphoma-,2000,Stage IV, CVP 11/14/2008 on maintenance Rituxiban · GERD 
    
  
RECOMMENDATIONS:  
 · Continue Singulair , Advair - will increase to 500/50 and prn albuterol · Zithromax X 3 months as steroid sparing antiinflammatory agent · Judicious cough suppressants · Discussed need for airway clearance · Discussed PFT findings- progressive declining DLCO- will monitor · Discussed CT findings-and negative sputum AFB findings and reassured · GERD control · Monitor for super infections ( on rituxiban) · Follow symptoms and aim for quality of life · Follow up in  6 weeks. Health maintenance screens deferred to Primary care provider. Ottoniel Velasquez MD 
 
 
 
 
 
 
If you have questions, please do not hesitate to call me. I look forward to following Ms. Carlos along with you.  
 
 
 
Sincerely, 
 
 
Ottoniel Velasquez MD

## 2017-03-24 NOTE — COMMUNICATION BODY
[facility]  Pulmonary, Critical Care, and Sleep Medicine      Pulmonary Office visit    Name: Alyssa Ram     : 1940     Date: 3/23/2017        Subjective:     Patient is a 64 y.o. female who presents for evaluation of chronic cough    17   Continues to have daily cough, productive of yellow thick mucus. Has post nasal drip. Takes benadryl at bedtime  Prednisone and antibiotic helped some but could not tolerate prednisone- GI side effects. Denies any fever, chills. Submitted 3 sputum specimens for AFB and culture after last visit  Denies any other symptoms       HPI:  Patient with history of Non-Hodgkin Lymphoma in .  Recevied -16, Cytoxin, and Prednisone treatments initially   Continues to be followed by Dr. Jose E Fowler-Oncology continues to receive Rituxan every 6 months  Patient states was ill a few weeks ago, received a solumedrol injection and symptoms have subsided  Patient has c/o chronic cough for over a year  Cough initially started as a dry cough, but now productive with yellow-green sputum  Patient currently taking Tessalon pearls to help with sleep at night   Patient has a history of seasonal allergies, and was receiving allergy shots for several years, but stopped due to lack of symptom improvement  Using Advair, Singular, and Albuterol  Denies fever, chills, hemoptysis  Pt did have one episode of fever while on a cruise in 2016  Pt does have history of GERD and currently taking Nexium  Pt uses walker for stability, has had a few falls in the past and had rib fractures  Pt c/o joint pain, mainly in the knees  4 years ago she had presumed parainfluenza vial infection with with 6 months of slowly resolved cough  negative FOB  .    HHN and Spiriva added  - now on advair and prn albuterol  She continue long term Rituxan for NHL  q 6 month    Reported nl immunoglobulins in [place] ]    ??\" Wall of esophagus involved\" and swallow is not always normal??     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 Of swelling    Morphine Unable to Obtain     Current Outpatient Prescriptions   Medication Sig Dispense Refill    acetaminophen (TYLENOL) 325 mg tablet Take  by mouth every four (4) hours as needed for Pain.  diphenhydrAMINE (BENADRYL) 25 mg capsule Take 25 mg by mouth every six (6) hours as needed.  albuterol (PROVENTIL HFA) 90 mcg/actuation inhaler Take  by inhalation.  esomeprazole (NEXIUM) 20 mg capsule Take  by mouth.  fluticasone-salmeterol (ADVAIR DISKUS) 100-50 mcg/dose diskus inhaler Take  by inhalation.  losartan (COZAAR) 100 mg tablet Take  by mouth.  montelukast (SINGULAIR) 10 mg tablet Take  by mouth.  rosuvastatin (CRESTOR) 10 mg tablet Take  by mouth.  traMADol (ULTRAM) 50 mg tablet Take  by mouth.  predniSONE (DELTASONE) 10 mg tablet 30 mg po dailyx 3 days,20 mg po daily x 3 days,10 mg po daily x 3 days 18 Tab 0    benzonatate (TESSALON PERLES) 100 mg capsule Take 100 mg by mouth three (3) times daily as needed for Cough.  PARoxetine (PAXIL) 10 mg tablet Take  by mouth.       tiotropium (SPIRIVA WITH HANDIHALER) 18 mcg inhalation capsule            Review of Systems:  HEENT: No epistaxis, no nasal drainage, no difficulty in swallowing, no redness in eyes  Respiratory: as above  Cardiovascular: no chest pain, no palpitations, no chronic leg edema, no syncope  Gastrointestinal: no abd pain, no vomiting, no diarrhea, no bleeding symptoms  Genitourinary: No urinary symptoms or hematuria  Integument/breast: No ulcers or rashes  Musculoskeletal:Neg  Neurological: No focal weakness, no seizures, no headaches  Behvioral/Psych: No anxiety, no depression  Constitutional: No fever, no chills, no weight loss, no night sweats     Objective:     Visit Vitals    /70 (BP 1 Location: Left arm, BP Patient Position: Sitting)    Pulse 94    Temp 98.6 °F (37 °C)    Resp 20    Ht 5' 6\" (1.676 m)    Wt 65.8 kg (145 lb)    LMP 07/23/1988    SpO2 96%    BMI 23.4 kg/m2        Physical Exam:   General: comfortable, no acute distress  HEENT: pupils reactive, sclera anicteric, EOM intact  Neck: No adenopathy or thyroid swelling, no lymphadenopathy or JVD, supple  CVS: S1S2 no murmurs  RS: Mod AE bilaterally, no tactile fremitus or egophony, no accessory muscle use  Abd: soft, non tender, no hepatosplenomegaly  Neuro: non focal, awake, alert  Extrm: no leg edema, clubbing or cyanosis  Skin: no rash    Data review:     Spirometry W/Bronchodilator (Pre/Post)2/26/2016  [facility]  Component Name Value Ref Range   FVC-Pre 2 L   FVC-%Pred-Pre 67 %   FEV1-Pre 1.43 L   FEV1-%Pred-Pre 64 %   FEV1-Post 1.77 L   FEV1-%Change-Post 23 %   FEV1FVC-Pre 72 %   FEFMax-Pre 3.69 L/sec    FEFMax-Post 5.57 L/sec    FEFMax-%Change-Post 50 %   [id number]-%Pred Pre 59 %   MVV-Pre 35 L/min   MVV-%Pred-Pre 41 %   DLCOCOR-Pre 7.82 ml/min/mmHg    DLCOCOR-Pred-Pre 29 %   DLVA-%Pred-Pre 72 %   Result Narrative   Adequate tracing and effort.  The technician reported that the results of this tests do not meet standards for reproducibility in regards to DLCO.  Caution with interpretation.  Spirometry does meet criteria for acceptability and reproducibility.    SPIROMETRY: No airflow obstruction by criteria, though there is scooping on the flow-volume loop which could represent obstructive disease.  The FEV1 and FVC are moderately reduced in a pattern consistent with a restrictive ventilatory abnormality.  There is a significant response to bronchodilator administration.    DLCO:  Severely decreased diffusing capacity.  The reduction in DLCO may be partially explained by the reduced lung volumes as measured by single breath VA determination and normal DL/VA ratio. CONCLUSIONS:  Spirometry with significant reversibility that normalizes lung volumes.  This is most consistent with asthma. Imaging:  I have personally reviewed the patients radiographs and have reviewed the reports:  CT scan chest- 1/2016 [facility]:  CHEST:    LUNGS: There are several very small pulmonary nodules bilaterally, similar to prior. No highly suspicious nodule. No other abnormal opacities. PLEURA: Normal, with no effusion or pneumothorax. AIRWAY: Scattered foci of bronchial mucoid impaction, most pronounced in the medial basal left lower lobe. MEDIASTINUM: Normal heart size. No pericardial fluid. Scattered coronary and aortic calcifications. LYMPH NODES: No enlarged lymph nodes. IMPRESSION:   · Cough Variant Asthma- with mild intermittent well controlled symptoms. Her NHL and therapy can induced her cough /bronchits thresh old but I see no evidence of atypical infections  . 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 , ? Allergic ( 9.8% eosinophilia on CBC)  · Abnormal PFT\"s with obstructive airways defect and reduced DLCO- ? Related to previous chemotherapy.   · Non hodgkins lymphoma-,2000,Stage IV, CVP 11/14/2008 on maintenance Rituxiban  · GERD          RECOMMENDATIONS:   · Continue Singulair , Advair - will increase to 500/50 and prn albuterol  · Zithromax X 3 months as steroid sparing antiinflammatory agent  · Judicious cough suppressants  · Discussed need for airway clearance  · Discussed PFT findings- progressive declining DLCO- will monitor  · Discussed CT findings-and negative sputum AFB findings and reassured  · GERD control  · Monitor for super infections ( on rituxiban)  · Follow symptoms and aim for quality of life  · Follow up in 6 weeks. Health maintenance screens deferred to Primary care provider.      Lm Matthews MD

## 2017-04-13 LAB
ACID FAST STN SPEC: NEGATIVE
MYCOBACTERIUM SPEC QL CULT: NEGATIVE
SPECIMEN PREPARATION: NORMAL
SPECIMEN SOURCE: NORMAL

## 2018-03-01 ENCOUNTER — OFFICE VISIT (OUTPATIENT)
Dept: PULMONOLOGY | Age: 78
End: 2018-03-01

## 2018-03-01 VITALS
SYSTOLIC BLOOD PRESSURE: 134 MMHG | TEMPERATURE: 97.8 F | WEIGHT: 143.5 LBS | OXYGEN SATURATION: 96 % | HEIGHT: 66 IN | BODY MASS INDEX: 23.06 KG/M2 | RESPIRATION RATE: 18 BRPM | DIASTOLIC BLOOD PRESSURE: 66 MMHG | HEART RATE: 93 BPM

## 2018-03-01 DIAGNOSIS — J41.1 BRONCHITIS, CHRONIC, MUCOPURULENT (HCC): ICD-10-CM

## 2018-03-01 DIAGNOSIS — R91.8 LUNG NODULE, MULTIPLE: Primary | ICD-10-CM

## 2018-03-01 DIAGNOSIS — R93.89 ABNORMAL CT SCAN, CHEST: ICD-10-CM

## 2018-03-01 RX ORDER — GUAIFENESIN 100 MG/5ML
400 SOLUTION ORAL
Qty: 473 ML | Refills: 2 | Status: SHIPPED | OUTPATIENT
Start: 2018-03-01

## 2018-03-01 RX ORDER — FLUTICASONE PROPIONATE AND SALMETEROL 250; 50 UG/1; UG/1
1 POWDER RESPIRATORY (INHALATION) EVERY 12 HOURS
COMMUNITY
End: 2018-03-08 | Stop reason: SDUPTHER

## 2018-03-01 RX ORDER — SULFAMETHOXAZOLE AND TRIMETHOPRIM 800; 160 MG/1; MG/1
1 TABLET ORAL 2 TIMES DAILY
Qty: 20 TAB | Refills: 0 | Status: SHIPPED | OUTPATIENT
Start: 2018-03-01

## 2018-03-01 NOTE — COMMUNICATION BODY
[facility]  Pulmonary, Critical Care, and Sleep Medicine      Pulmonary Office visit    Name: Best Baig     : 1940     Date: 3/1/2018        Subjective:     Patient is a 76 y.o. female who presents for evaluation of chronic cough    18   Got sick over the holidays. \" flu like\"   Continues to have daily cough, productive of yellow thick mucus.- Coughing hard and difficult to expectorate. Was treated with 10 days- levaquin with partial response. Has post nasal drip. Takes benadryl at bedtime  Prednisone and antibiotic helped some but could not tolerate prednisone- GI side effects. Denies any fever, chills. Submitted 3 sputum specimens for AFB and culture - all negative. Denies any other symptoms   still on Advair at 250/50 dose. ( was prescribed 500/50- 3/2017 and sent to [address] [place] [address]? Not changed). HPI:  Patient with history of Non-Hodgkin Lymphoma in .  Recevied -16, Cytoxin, and Prednisone treatments initially   Continues to be followed by Dr. Janneth Fowler-Oncology continues to receive Rituxan every 6 months  Patient states was ill a few weeks ago, received a solumedrol injection and symptoms have subsided  Patient has c/o chronic cough for over a year  Cough initially started as a dry cough, but now productive with yellow-green sputum  Patient currently taking Tessalon pearls to help with sleep at night   Patient has a history of seasonal allergies, and was receiving allergy shots for several years, but stopped due to lack of symptom improvement  Using Advair, Singular, and Albuterol  Denies fever, chills, hemoptysis  Pt did have one episode of fever while on a cruise in 2016  Pt does have history of GERD and currently taking Nexium  Pt uses walker for stability, has had a few falls in the past and had rib fractures  Pt c/o joint pain, mainly in the knees  4 years ago she had presumed parainfluenza vial infection with with 6 months of slowly resolved cough  negative FOB 2009 .    HHN and Spiriva added  - now on advair and prn albuterol  She continue long term Rituxan for NHL  q 6 month    Reported nl immunoglobulins in [place] ]    ??\" Wall of esophagus involved\" and swallow is not always normal??   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 Of swelling    Morphine Unable to Obtain     Current Outpatient Prescriptions   Medication Sig Dispense Refill    fluticasone-salmeterol (ADVAIR) 500-50 mcg/dose diskus inhaler Take 1 Puff by inhalation two (2) times a day. 1 Inhaler 3    fluticasone (FLONASE) 50 mcg/actuation nasal spray 2 squirts each nostril HS 1 Bottle 3    acetaminophen (TYLENOL) 325 mg tablet Take  by mouth every four (4) hours as needed for Pain.  diphenhydrAMINE (BENADRYL) 25 mg capsule Take 25 mg by mouth every six (6) hours as needed.  benzonatate (TESSALON PERLES) 100 mg capsule Take 100 mg by mouth three (3) times daily as needed for Cough.  albuterol (PROVENTIL HFA) 90 mcg/actuation inhaler Take  by inhalation.  esomeprazole (NEXIUM) 20 mg capsule Take  by mouth.  fluticasone-salmeterol (ADVAIR DISKUS) 100-50 mcg/dose diskus inhaler Take  by inhalation.  losartan (COZAAR) 100 mg tablet Take  by mouth.  montelukast (SINGULAIR) 10 mg tablet Take  by mouth.  PARoxetine (PAXIL) 10 mg tablet Take  by mouth.  rosuvastatin (CRESTOR) 10 mg tablet Take  by mouth.  traMADol (ULTRAM) 50 mg tablet Take  by mouth.       tiotropium (SPIRIVA WITH HANDIHALER) 18 mcg inhalation capsule        Review of Systems:  HEENT: No epistaxis, no nasal drainage, no difficulty in swallowing, no redness in eyes  Respiratory: as above  Cardiovascular: no chest pain, no palpitations, no chronic leg edema, no syncope  Gastrointestinal: no abd pain, no vomiting, no diarrhea, no bleeding symptoms  Genitourinary: No urinary symptoms or hematuria  Integument/breast: No ulcers or rashes  Musculoskeletal:Neg  Neurological: No focal weakness, no seizures, no headaches  Behvioral/Psych: No anxiety, no depression  Constitutional: No fever, no chills, no weight loss, no night sweats     Objective:     Visit Vitals    [facility] 07/23/1988     Physical Exam:   General: comfortable, no acute distress  HEENT: pupils reactive, sclera anicteric, EOM intact  Neck: No adenopathy or thyroid swelling, no lymphadenopathy or JVD, supple  CVS: S1S2 no murmurs  RS: Mod AE bilaterally, no tactile fremitus or egophony, no accessory muscle use  Abd: soft, non tender, no hepatosplenomegaly  Neuro: non focal, awake, alert  Extrm: no leg edema, clubbing or cyanosis  Skin: no rash    Data review:     Spirometry W/Bronchodilator (Pre/Post)2/26/2016  [facility]  Component Name Value Ref Range   FVC-Pre 2 L   FVC-%Pred-Pre 67 %   FEV1-Pre 1.43 L   FEV1-%Pred-Pre 64 %   FEV1-Post 1.77 L   FEV1-%Change-Post 23 %   FEV1FVC-Pre 72 %   FEFMax-Pre 3.69 L/sec    FEFMax-Post 5.57 L/sec    FEFMax-%Change-Post 50 %   [id number]-%Pred Pre 59 %   MVV-Pre 35 L/min   MVV-%Pred-Pre 41 %   DLCOCOR-Pre 7.82 ml/min/mmHg    DLCOCOR-Pred-Pre 29 %   DLVA-%Pred-Pre 72 %   Result Narrative   Adequate tracing and effort.  The technician reported that the results of this tests do not meet standards for reproducibility in regards to DLCO.  Caution with interpretation.  Spirometry does meet criteria for acceptability and reproducibility.    SPIROMETRY: No airflow obstruction by criteria, though there is scooping on the flow-volume loop which could represent obstructive disease.  The FEV1 and FVC are moderately reduced in a pattern consistent with a restrictive ventilatory abnormality.  There is a significant response to bronchodilator administration.    DLCO:  Severely decreased diffusing capacity.  The reduction in DLCO may be partially explained by the reduced lung volumes as measured by single breath VA determination and normal DL/VA ratio. CONCLUSIONS:  Spirometry with significant reversibility that normalizes lung volumes.  This is most consistent with asthma. Imaging:  I have personally reviewed the patients radiographs and have reviewed the reports:    CT scan chest: 1/2018:  1. No lymph node enlargement chest, abdomen or pelvis. 2. Development of several subsolid nodules in the right lower lobe. These could be inflammatory but neoplasm is not excluded; 3-6 month dedicated followup recommended. CT scan chest- 1/2016 [facility]:  CHEST:  LUNGS: There are several very small pulmonary nodules bilaterally, similar to prior. No highly suspicious nodule. No other abnormal opacities. PLEURA: Normal, with no effusion or pneumothorax. AIRWAY: Scattered foci of bronchial mucoid impaction, most pronounced in the medial basal left lower lobe. MEDIASTINUM: Normal heart size. No pericardial fluid. Scattered coronary and aortic calcifications. LYMPH NODES: No enlarged lymph nodes. IMPRESSION:   · Abnormal Ct scan of chest- several subsolid nodules in the right lower lobe. Previously noted as well. · Chronic persistent cough - impacted mucus. · Cough Variant Asthma- with mild intermittent well controlled symptoms. Her NHL and therapy can induced her cough /bronchits thresh old but I see no evidence of atypical infections  . 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 , ? Allergic ( 9.8% eosinophilia on CBC)  · Abnormal PFT\"s with obstructive airways defect and reduced DLCO- ? Related to previous chemotherapy.   · Non hodgkins lymphoma-,2000,Stage IV, CVP 11/14/2008 on maintenance Rituxiban  · GERD         RECOMMENDATIONS:   · Needs intensified airway clearance- will add Rylee: given with instructions for use  · Hydration- increased po intake and humidify ambient air  · Will treat for acute bronchitis- Bactrim DS BID x10 days and guafenesin  · continue Singulair , Advair - will increase to 500/50 and prn albuterol  · Avoid cough suppressants  · Discussed PFT findings- progressive declining DLCO- will monitor  · Discussed CT findings-likely inflammatory-mucus plugging related. and negative sputum AFB findings and reassured. Will order follow up CT in 3 months. · GERD control  · Monitor for super infections ( on rituxiban)  · Follow symptoms and aim for quality of life  · Follow up in  3  Months. Health maintenance screens deferred to Primary care provider.      Rustam Poe MD

## 2018-03-01 NOTE — PATIENT INSTRUCTIONS
Learning About COPD and Clearing Your Lungs  How does clearing your lungs affect COPD? When you have COPD, you may have too much mucus in your lungs. Learning to clear your lungs may help you save energy and improves your breathing. It may also help prevent lung infections. There are three ways to clear your lungs:  · Controlled coughing  · Postural drainage  · Chest percussion  How do you do controlled coughing? Coughing is how your body tries to get rid of mucus. But the kind of coughing you cannot control makes things worse. It causes your airways to close. It also traps the mucus in your lungs. Controlled coughing comes from deep in your lungs. It loosens mucus and moves it though your airways. It is best to do it after you use your inhaler or other medicine. 1. Sit on the edge of a chair. Keep both feet on the floor. 2. Lean forward a little. Relax. 3. Breathe in slowly through your nose. Fold your arms over your belly. 4. Lean forward as you breathe out. Push your arms against your belly. 5. Cough 2 or 3 times as you exhale with your mouth slightly open. Make the coughs short and sharp. Push on your belly with your arms as you cough. The first cough brings the mucus through the lung airways. The next coughs bring it up and out. 6. Inhale again, but do it slowly and gently through your nose. Do not take quick or deep breaths through your mouth. It can block the mucus coming out of the lungs. It also can cause uncontrolled coughing. 7. Rest, and repeat if you need to. How do you do postural drainage? Postural drainage means lying down in different positions to help drain mucus from your lungs. Hold each position for 5 minutes. Do it about 30 minutes after you use your inhaler. Make sure you have an empty stomach. If you need to cough, sit up and do controlled coughing. 1. To drain the front of your lungs: Make sure your chest is lower than your hips. Put two pillows under your hips.  Use a small pillow under your head. Keep your arms at your sides. Then follow these instructions for breathing:  ¨ Breathe in: With one hand on your belly and the other on your chest, breathe in. Push your belly out as far as possible. You should be able to feel the hand on your belly move out, while the hand on your chest should not move. ¨ Breathe out: When you breathe out, you should be able to feel the hand on your belly move in. This is called diaphragmatic breathing. You will use it in the other drainage positions too. 2. To drain the sides of your lungs: Place two or three pillows under your hips. Use a small pillow under your head. Make sure your chest is lower than your hips. Use diaphragmatic breathing. After 5 or 10 minutes, switch sides. 3. To drain the back of your lungs: Place two or three pillows under your hips. Use a small pillow under your head. Kneel over the pillows. Place your arms by your head. Use diaphragmatic breathing. How do you do chest percussion? Chest percussion means that you lightly tap your chest and back. The tapping loosens the mucus in your lungs. · Cup your hand, and lightly tap your chest and back. · Ask your doctor where the best spots are to tap. Avoid your spine and breastbone. · It may be easier to have someone do the tapping for you. Follow-up care is a key part of your treatment and safety. Be sure to make and go to all appointments, and call your doctor if you are having problems. It's also a good idea to know your test results and keep a list of the medicines you take. Where can you learn more? Go to http://timoteo-daniel.info/. Enter B083 in the search box to learn more about \"Learning About COPD and Clearing Your Lungs. \"  Current as of: May 12, 2017  Content Version: 11.4  © 0419-0824 [employer]. Care instructions adapted under license by [employer] (which disclaims liability or warranty for this information).  If you have questions about a medical condition or this instruction, always ask your healthcare professional. [place] [address] any warranty or liability for your use of this information.

## 2018-03-01 NOTE — PROGRESS NOTES
[facility]  Pulmonary, Critical Care, and Sleep Medicine      Pulmonary Office visit    Name: Jessica Jackson     : 1940     Date: 3/1/2018        Subjective:     Patient is a 76 y.o. female who presents for evaluation of chronic cough    18   Got sick over the holidays. \" flu like\"   Continues to have daily cough, productive of yellow thick mucus.- Coughing hard and difficult to expectorate. Was treated with 10 days- levaquin with partial response. Has post nasal drip. Takes benadryl at bedtime  Prednisone and antibiotic helped some but could not tolerate prednisone- GI side effects. Denies any fever, chills. Submitted 3 sputum specimens for AFB and culture - all negative. Denies any other symptoms   still on Advair at 250/50 dose. ( was prescribed 500/50- 3/2017 and sent to [address] [place] [address]? Not changed). HPI:  Patient with history of Non-Hodgkin Lymphoma in .  Recevied -14, Cytoxin, and Prednisone treatments initially   Continues to be followed by Dr. Perez Agent [place]-Oncology continues to receive Rituxan every 6 months  Patient states was ill a few weeks ago, received a solumedrol injection and symptoms have subsided  Patient has c/o chronic cough for over a year  Cough initially started as a dry cough, but now productive with yellow-green sputum  Patient currently taking Tessalon pearls to help with sleep at night   Patient has a history of seasonal allergies, and was receiving allergy shots for several years, but stopped due to lack of symptom improvement  Using Advair, Singular, and Albuterol  Denies fever, chills, hemoptysis  Pt did have one episode of fever while on a cruise in 2016  Pt does have history of GERD and currently taking Nexium  Pt uses walker for stability, has had a few falls in the past and had rib fractures  Pt c/o joint pain, mainly in the knees  4 years ago she had presumed parainfluenza vial infection with with 6 months of slowly resolved cough  negative FOB 2009 .    HHN and Spiriva added  - now on advair and prn albuterol  She continue long term Rituxan for NHL  q 6 month    Reported [place] immunoglobulins [place]    ??\" Wall of esophagus involved\" and swallow is not always normal??   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 Of swelling    Morphine Unable to Obtain     Current Outpatient Prescriptions   Medication Sig Dispense Refill    fluticasone-salmeterol (ADVAIR) 500-50 mcg/dose diskus inhaler Take 1 Puff by inhalation two (2) times a day. 1 Inhaler 3    fluticasone (FLONASE) 50 mcg/actuation nasal spray 2 squirts each nostril HS 1 Bottle 3    acetaminophen (TYLENOL) 325 mg tablet Take  by mouth every four (4) hours as needed for Pain.  diphenhydrAMINE (BENADRYL) 25 mg capsule Take 25 mg by mouth every six (6) hours as needed.  benzonatate (TESSALON PERLES) 100 mg capsule Take 100 mg by mouth three (3) times daily as needed for Cough.  albuterol (PROVENTIL HFA) 90 mcg/actuation inhaler Take  by inhalation.  esomeprazole (NEXIUM) 20 mg capsule Take  by mouth.  fluticasone-salmeterol (ADVAIR DISKUS) 100-50 mcg/dose diskus inhaler Take  by inhalation.  losartan (COZAAR) 100 mg tablet Take  by mouth.  montelukast (SINGULAIR) 10 mg tablet Take  by mouth.  PARoxetine (PAXIL) 10 mg tablet Take  by mouth.  rosuvastatin (CRESTOR) 10 mg tablet Take  by mouth.  traMADol (ULTRAM) 50 mg tablet Take  by mouth.       tiotropium (SPIRIVA WITH HANDIHALER) 18 mcg inhalation capsule        Review of Systems:  HEENT: No epistaxis, no nasal drainage, no difficulty in swallowing, no redness in eyes  Respiratory: as above  Cardiovascular: no chest pain, no palpitations, no chronic leg edema, no syncope  Gastrointestinal: no abd pain, no vomiting, no diarrhea, no bleeding symptoms  Genitourinary: No urinary symptoms or hematuria  Integument/breast: No ulcers or rashes  Musculoskeletal:Neg  Neurological: No focal weakness, no seizures, no headaches  Behvioral/Psych: No anxiety, no depression  Constitutional: No fever, no chills, no weight loss, no night sweats     Objective:     Visit Vitals    [facility] 07/23/1988     Physical Exam:   General: comfortable, no acute distress  HEENT: pupils reactive, sclera anicteric, EOM intact  Neck: No adenopathy or thyroid swelling, no lymphadenopathy or JVD, supple  CVS: S1S2 no murmurs  RS: Mod AE bilaterally, no tactile fremitus or egophony, no accessory muscle use  Abd: soft, non tender, no hepatosplenomegaly  Neuro: non focal, awake, alert  Extrm: no leg edema, clubbing or cyanosis  Skin: no rash    Data review:     Spirometry W/Bronchodilator (Pre/Post)2/26/2016  [address]  Component Name Value Ref Range   FVC-Pre 2 L   FVC-%Pred-Pre 67 %   FEV1-Pre 1.43 L   FEV1-%Pred-Pre 64 %   FEV1-Post 1.77 L   FEV1-%Change-Post 23 %   FEV1FVC-Pre 72 %   FEFMax-Pre 3.69 L/sec    FEFMax-Post 5.57 L/sec    FEFMax-%Change-Post 50 %   [id number]-%Pred Pre 59 %   MVV-Pre 35 L/min   MVV-%Pred-Pre 41 %   DLCOCOR-Pre 7.82 ml/min/mmHg    DLCOCOR-Pred-Pre 29 %   DLVA-%Pred-Pre 72 %   Result Narrative   Adequate tracing and effort.  The technician reported that the results of this tests do not meet standards for reproducibility in regards to DLCO.  Caution with interpretation.  Spirometry does meet criteria for acceptability and reproducibility.    SPIROMETRY: No airflow obstruction by criteria, though there is scooping on the flow-volume loop which could represent obstructive disease.  The FEV1 and FVC are moderately reduced in a pattern consistent with a restrictive ventilatory abnormality.  There is a significant response to bronchodilator administration.    DLCO:  Severely decreased diffusing capacity.  The reduction in DLCO may be partially explained by the reduced lung volumes as measured by single breath VA determination and normal DL/VA ratio. CONCLUSIONS:  Spirometry with significant reversibility that normalizes lung volumes.  This is most consistent with asthma. Imaging:  I have personally reviewed the patients radiographs and have reviewed the reports:    CT scan chest: 1/2018:  1. No lymph node enlargement chest, abdomen or pelvis. 2. Development of several subsolid nodules in the right lower lobe. These could be inflammatory but neoplasm is not excluded; 3-6 month dedicated followup recommended. CT scan chest- 1/2016 [facility]:  CHEST:  LUNGS: There are several very small pulmonary nodules bilaterally, similar to prior. No highly suspicious nodule. No other abnormal opacities. PLEURA: Normal, with no effusion or pneumothorax. AIRWAY: Scattered foci of bronchial mucoid impaction, most pronounced in the medial basal left lower lobe. MEDIASTINUM: Normal heart size. No pericardial fluid. Scattered coronary and aortic calcifications. LYMPH NODES: No enlarged lymph nodes. IMPRESSION:   · Abnormal Ct scan of chest- several subsolid nodules in the right lower lobe. Previously noted as well. · Chronic persistent cough - impacted mucus. · Cough Variant Asthma- with mild intermittent well controlled symptoms. Her NHL and therapy can induced her cough /bronchits thresh old but I see no evidence of atypical infections  . 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 , ? Allergic ( 9.8% eosinophilia on CBC)  · Abnormal PFT\"s with obstructive airways defect and reduced DLCO- ? Related to previous chemotherapy.   · Non hodgkins lymphoma-,2000,Stage IV, CVP 11/14/2008 on maintenance Rituxiban  · GERD         RECOMMENDATIONS:   · Needs intensified airway clearance- will add Rylee: given with instructions for use  · Hydration- increased po intake and humidify ambient air  · Will treat for acute bronchitis- Bactrim DS BID x10 days and guafenesin  · continue Singulair , Advair - will increase to 500/50 and prn albuterol  · Avoid cough suppressants  · Discussed PFT findings- progressive declining DLCO- will monitor  · Discussed CT findings-likely inflammatory-mucus plugging related. and negative sputum AFB findings and reassured. Will order follow up CT in 3 months. · GERD control  · Monitor for super infections ( on rituxiban)  · Follow symptoms and aim for quality of life  · Follow up in  3  Months. Health maintenance screens deferred to Primary care provider.      Garrett Mora MD

## 2018-03-01 NOTE — MR AVS SNAPSHOT
[address] [facility], [address] [place] [address] 
[phone] Patient: Rhiannon Ramos MRN: [id number] TALIA:2/95/4126 Visit Information Date & Time Provider Department Dept. Phone Encounter #  
 3/1/2018 11:45 AM MD [facility] \[facility]\"" [id number] Follow-up Instructions Return in about 3 months (around 6/1/2018). Upcoming Health Maintenance Date Due DTaP/Tdap/Td series (1 - Tdap) 6/17/1959 ZOSTER VACCINE AGE 60> 4/17/1998 GLAUCOMA SCREENING Q2Y 6/17/2003 OSTEOPOROSIS SCREENING (DEXA) 6/17/2003 MEDICARE YEARLY EXAM 6/17/2003 Pneumococcal 65+ High/Highest Risk (2 of 2 - PPSV23) 1/18/2016 Allergies as of 3/1/2018  Review Complete On: 3/1/2018 By: Sheryl Hays MD  
  
 Severity Noted Reaction Type Reactions Influenza Virus Vaccine, Specific Medium 10/23/2016   Systemic Swelling, Other (comments) Reddened right arm and large amt. Of swelling Morphine  02/26/2013    Unable to Obtain Current Immunizations  Never Reviewed Name Date Influenza High Dose Vaccine PF 10/23/2014 Pneumococcal Conjugate (PCV-13) 11/23/2015 Not reviewed this visit You Were Diagnosed With   
  
 Codes Comments Lung nodule, multiple    -  Primary ICD-10-CM: R91.8 ICD-9-CM: 793.19 Bronchitis, chronic, mucopurulent (HCC)     ICD-10-CM: J41.1 ICD-9-CM: 491.1 Abnormal CT scan, chest     ICD-10-CM: R93.8 ICD-9-CM: 793.2 Vitals BP Pulse Temp Resp Height(growth percentile) Weight(growth percentile) 134/66 (BP 1 Location: Left arm, BP Patient Position: Sitting) 93 97.8 °F (36.6 °C) (Oral) 18 5' 6\" (1.676 m) 143 lb 8 oz (65.1 kg) LMP SpO2 BMI OB Status Smoking Status 07/23/1988 96% 23.16 kg/m2 Hysterectomy Never Smoker BMI and BSA Data Body Mass Index Body Surface Area  
 23.16 kg/m 2 1.74 m 2 Preferred Pharmacy Pharmacy Name Phone [address] [facility] [phone] Your Updated Medication List  
  
   
This list is accurate as of 3/1/18 12:33 PM.  Always use your most recent med list.  
  
  
  
  
 * ADVAIR DISKUS 100-50 mcg/dose diskus inhaler Generic drug:  fluticasone-salmeterol Take  by inhalation. * ADVAIR DISKUS 250-50 mcg/dose diskus inhaler Generic drug:  fluticasone-salmeterol Take 1 Puff by inhalation every twelve (12) hours. * fluticasone-salmeterol 500-50 mcg/dose diskus inhaler Commonly known as:  ADVAIR Take 1 Puff by inhalation two (2) times a day. BENADRYL 25 mg capsule Generic drug:  diphenhydrAMINE Take 25 mg by mouth every six (6) hours as needed. COZAAR 100 mg tablet Generic drug:  losartan Take  by mouth. CRESTOR 10 mg tablet Generic drug:  rosuvastatin Take  by mouth. fluticasone 50 mcg/actuation nasal spray Commonly known as:  FLONASE  
2 squirts each nostril HS  
  
 guaiFENesin 100 mg/5 mL liquid Commonly known as:  ROBITUSSIN Take 20 mL by mouth three (3) times daily as needed for Cough. NexIUM 20 mg capsule Generic drug:  esomeprazole Take  by mouth. PAXIL 10 mg tablet Generic drug:  PARoxetine Take  by mouth. PROVENTIL HFA 90 mcg/actuation inhaler Generic drug:  albuterol Take  by inhalation. SINGULAIR 10 mg tablet Generic drug:  montelukast  
Take  by mouth. SPIRIVA WITH HANDIHALER 18 mcg inhalation capsule Generic drug:  tiotropium  
  
 traMADol 50 mg tablet Commonly known as:  ULTRAM  
Take  by mouth. trimethoprim-sulfamethoxazole 160-800 mg per tablet Commonly known as:  BACTRIM DS Take 1 Tab by mouth two (2) times a day. TYLENOL 325 mg tablet Generic drug:  acetaminophen Take  by mouth every four (4) hours as needed for Pain. * Notice:   This list has 3 medication(s) that are the same as other medications prescribed for you. Read the directions carefully, and ask your doctor or other care provider to review them with you. Prescriptions Printed Refills  
 trimethoprim-sulfamethoxazole (BACTRIM DS) 160-800 mg per tablet 0 Sig: Take 1 Tab by mouth two (2) times a day. Class: Print Route: Oral  
 guaiFENesin (ROBITUSSIN) 100 mg/5 mL liquid 2 Sig: Take 20 mL by mouth three (3) times daily as needed for Cough. Class: Print Route: Oral  
  
Follow-up Instructions Return in about 3 months (around 6/1/2018). To-Do List   
 05/01/2018 Imaging:  CT CHEST WO CONT Patient Instructions Learning About COPD and Clearing Your Lungs How does clearing your lungs affect COPD? When you have COPD, you may have too much mucus in your lungs. Learning to clear your lungs may help you save energy and improves your breathing. It may also help prevent lung infections. There are three ways to clear your lungs: · Controlled coughing · Postural drainage · Chest percussion How do you do controlled coughing? Coughing is how your body tries to get rid of mucus. But the kind of coughing you cannot control makes things worse. It causes your airways to close. It also traps the mucus in your lungs. Controlled coughing comes from deep in your lungs. It loosens mucus and moves it though your airways. It is best to do it after you use your inhaler or other medicine. 1. Sit on the edge of a chair. Keep both feet on the floor. 2. Lean forward a little. Relax. 3. Breathe in slowly through your nose. Fold your arms over your belly. 4. Lean forward as you breathe out. Push your arms against your belly. 5. Cough 2 or 3 times as you exhale with your mouth slightly open. Make the coughs short and sharp. Push on your belly with your arms as you cough. The first cough brings the mucus through the lung airways. The next coughs bring it up and out. 6. Inhale again, but do it slowly and gently through your nose. Do not take quick or deep breaths through your mouth. It can block the mucus coming out of the lungs. It also can cause uncontrolled coughing. 7. Rest, and repeat if you need to. How do you do postural drainage? Postural drainage means lying down in different positions to help drain mucus from your lungs. Hold each position for 5 minutes. Do it about 30 minutes after you use your inhaler. Make sure you have an empty stomach. If you need to cough, sit up and do controlled coughing. 1. To drain the front of your lungs: Make sure your chest is lower than your hips. Put two pillows under your hips. Use a small pillow under your head. Keep your arms at your sides. Then follow these instructions for breathing: ¨ Breathe in: With one hand on your belly and the other on your chest, breathe in. Push your belly out as far as possible. You should be able to feel the hand on your belly move out, while the hand on your chest should not move. ¨ Breathe out: When you breathe out, you should be able to feel the hand on your belly move in. This is called diaphragmatic breathing. You will use it in the other drainage positions too. 2. To drain the sides of your lungs: Place two or three pillows under your hips. Use a small pillow under your head. Make sure your chest is lower than your hips. Use diaphragmatic breathing. After 5 or 10 minutes, switch sides. 3. To drain the back of your lungs: Place two or three pillows under your hips. Use a small pillow under your head. Kneel over the pillows. Place your arms by your head. Use diaphragmatic breathing. How do you do chest percussion? Chest percussion means that you lightly tap your chest and back. The tapping loosens the mucus in your lungs. · Cup your hand, and lightly tap your chest and back. · Ask your doctor where the best spots are to tap. Avoid your spine and breastbone. · It may be easier to have someone do the tapping for you. Follow-up care is a key part of your treatment and safety. Be sure to make and go to all appointments, and call your doctor if you are having problems. It's also a good idea to know your test results and keep a list of the medicines you take. Where can you learn more? Go to http://timoteo-daniel.info/. Enter O667 in the search box to learn more about \"Learning About COPD and Clearing Your Lungs. \" Current as of: May 12, 2017 Content Version: 11.4 © 4236-9478 [employer]. Care instructions adapted under license by [employer] (which disclaims liability or warranty for this information). If you have questions about a medical condition or this instruction, always ask your healthcare professional. [address] any warranty or liability for your use of this information. Introducing [facility]! Demetria Gallegos introduces [employer] patient portal. Now you can access parts of your medical record, email your doctor's office, and request medication refills online. 1. In your internet browser, go to [email or web address] 2. Click on the First Time User? Click Here link in the Sign In box. You will see the New Member Sign Up page. 3. Enter your [employer] Access Code exactly as it appears below. You will not need to use this code after youve completed the sign-up process. If you do not sign up before the expiration date, you must request a new code. · [employer] Access Code: [email or web address] Expires: 5/30/2018 11:38 AM 
 
4. Enter the last four digits of your Social Security Number (xxxx) and Date of Birth (mm/dd/yyyy) as indicated and click Submit. You will be taken to the next sign-up page. 5. Create a Luxera ID. This will be your [employer] login ID and cannot be changed, so think of one that is secure and easy to remember. 6. Create a [employer] password. You can change your password at any time. 7. Enter your Password Reset Question and Answer. This can be used at a later time if you forget your password. 8. Enter your e-mail address. You will receive e-mail notification when new information is available in [address] 9. Click Sign Up. You can now view and download portions of your medical record. 10. Click the Download Summary menu link to download a portable copy of your medical information. If you have questions, please visit the Frequently Asked Questions section of the [employer] website. Remember, [employer] is NOT to be used for urgent needs. For medical emergencies, dial 911. Now available from your iPhone and Android! Please provide this summary of care documentation to your next provider. Your primary care clinician is listed as Masha Perez. If you have any questions after today's visit, please call [phone].

## 2018-03-01 NOTE — LETTER
3/1/2018 3:45 PM 
 
Patient:  Macrina Garnica YOB: 1940 Date of Visit: 3/1/2018 Dear Tiffanie Wolf MD 
[address] [facility]  [place] [facility] [address] VIA Facsimile: [phone] 
 : 
 
 
Thank you for referring Ms. Slim Dominguez to me for evaluation/treatment. Below are the relevant portions of my assessment and plan of care. [facility] Pulmonary, Critical Care, and Sleep Medicine Pulmonary Office visit Name: Macrina Garnica : 1940 Date: 3/1/2018 Subjective:  
 
Patient is a 76 y.o. female who presents for evaluation of chronic cough 16 Got sick over the holidays. \" flu like\" Continues to have daily cough, productive of yellow thick mucus.- Coughing hard and difficult to expectorate. Was treated with 10 days- levaquin with partial response. Has post nasal drip. Takes benadryl at bedtime Prednisone and antibiotic helped some but could not tolerate prednisone- GI side effects. Denies any fever, chills. Submitted 3 sputum specimens for AFB and culture - all negative. Denies any other symptoms 
 still on Advair at 250/50 dose. ( was prescribed 500/50- 3/2017 and sent to [address] [place] [address]? Not changed). HPI: 
Patient with history of Non-Hodgkin Lymphoma in . Recevied -16, Cytoxin, and Prednisone treatments initially Continues to be followed by Dr. Faustina Fowler-Oncology continues to receive Rituxan every 6 months Patient states was ill a few weeks ago, received a solumedrol injection and symptoms have subsided Patient has c/o chronic cough for over a year Cough initially started as a dry cough, but now productive with yellow-green sputum Patient currently taking Tessalon pearls to help with sleep at night Patient has a history of seasonal allergies, and was receiving allergy shots for several years, but stopped due to lack of symptom improvement Using Advair, Singular, and Albuterol Denies fever, chills, hemoptysis Pt did have one episode of fever while on a cruise in September 2016 Pt does have history of GERD and currently taking Nexium Pt uses walker for stability, has had a few falls in the past and had rib fractures Pt c/o joint pain, mainly in the knees 4 years ago she had presumed parainfluenza vial infection with with 6 months of slowly resolved cough  negative FOB 2009 .   
HHN and Spiriva added  - now on advair and prn albuterol She continue long term Rituxan for NHL  q 6 month Reported nl immunoglobulins in [place] ]   
??\" Wall of esophagus involved\" and swallow is not always normal?? 
 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 Of swelling  Morphine Unable to Obtain Current Outpatient Prescriptions Medication Sig Dispense Refill  fluticasone-salmeterol (ADVAIR) 500-50 mcg/dose diskus inhaler Take 1 Puff by inhalation two (2) times a day. 1 Inhaler 3  
 fluticasone (FLONASE) 50 mcg/actuation nasal spray 2 squirts each nostril HS 1 Bottle 3  
 acetaminophen (TYLENOL) 325 mg tablet Take  by mouth every four (4) hours as needed for Pain.  diphenhydrAMINE (BENADRYL) 25 mg capsule Take 25 mg by mouth every six (6) hours as needed.  benzonatate (TESSALON PERLES) 100 mg capsule Take 100 mg by mouth three (3) times daily as needed for Cough.  albuterol (PROVENTIL HFA) 90 mcg/actuation inhaler Take  by inhalation.  esomeprazole (NEXIUM) 20 mg capsule Take  by mouth.  fluticasone-salmeterol (ADVAIR DISKUS) 100-50 mcg/dose diskus inhaler Take  by inhalation.  losartan (COZAAR) 100 mg tablet Take  by mouth.  montelukast (SINGULAIR) 10 mg tablet Take  by mouth.  PARoxetine (PAXIL) 10 mg tablet Take  by mouth.  rosuvastatin (CRESTOR) 10 mg tablet Take  by mouth.  traMADol (ULTRAM) 50 mg tablet Take  by mouth.  tiotropium (SPIRIVA WITH HANDIHALER) 18 mcg inhalation capsule Review of Systems: 
HEENT: No epistaxis, no nasal drainage, no difficulty in swallowing, no redness in eyes Respiratory: as above Cardiovascular: no chest pain, no palpitations, no chronic leg edema, no syncope Gastrointestinal: no abd pain, no vomiting, no diarrhea, no bleeding symptoms Genitourinary: No urinary symptoms or hematuria Integument/breast: No ulcers or rashes Musculoskeletal:Neg 
Neurological: No focal weakness, no seizures, no headaches Behvioral/Psych: No anxiety, no depression Constitutional: No fever, no chills, no weight loss, no night sweats Objective:  
 
Visit Vitals  [facility] 07/23/1988 Physical Exam:  
General: comfortable, no acute distress HEENT: pupils reactive, sclera anicteric, EOM intact Neck: No adenopathy or thyroid swelling, no lymphadenopathy or JVD, supple CVS: S1S2 no murmurs RS: Mod AE bilaterally, no tactile fremitus or egophony, no accessory muscle use Abd: soft, non tender, no hepatosplenomegaly Neuro: non focal, awake, alert Extrm: no leg edema, clubbing or cyanosis Skin: no rash Data review:  
 
Spirometry W/Bronchodilator (Pre/Post)2/26/2016 [employer] Component Name Value Ref Range FVC-Pre 2 L  
FVC-%Pred-Pre 67 % FEV1-Pre 1.43 L FEV1-%Pred-Pre 64 % FEV1-Post 1.77 L FEV1-%Change-Post 23 % FEV1FVC-Pre 72 % FEFMax-Pre 3.69 L/sec FEFMax-Post 5.57 L/sec FEFMax-%Change-Post 50 % [id number]-%Pred Pre 59 % MVV-Pre 35 L/min MVV-%Pred-Pre 41 % DLCOCOR-Pre 7.82 ml/min/mmHg DLCOCOR-Pred-Pre 29 % DLVA-%Pred-Pre 72 % Result Narrative Adequate tracing and effort.  The technician reported that the results of this tests do not meet standards for reproducibility in regards to DLCO.  Caution with interpretation.  Spirometry does meet criteria for acceptability and reproducibility.   
SPIROMETRY: No airflow obstruction by criteria, though there is scooping on the flow-volume loop which could represent obstructive disease.  The FEV1 and FVC are moderately reduced in a pattern consistent with a restrictive ventilatory abnormality.  There is a significant response to bronchodilator administration.   
DLCO: 
Severely decreased diffusing capacity.  The reduction in DLCO may be partially explained by the reduced lung volumes as measured by single breath VA determination and normal DL/VA ratio. CONCLUSIONS:  Spirometry with significant reversibility that normalizes lung volumes.  This is most consistent with asthma. Imaging: 
I have personally reviewed the patients radiographs and have reviewed the reports: 
 
CT scan chest: 1/2018: 
1. No lymph node enlargement chest, abdomen or pelvis. 2. Development of several subsolid nodules in the right lower lobe. These could be inflammatory but neoplasm is not excluded; 3-6 month dedicated followup recommended. CT scan chest- 1/2016 [facility]: CHEST: 
LUNGS: There are several very small pulmonary nodules bilaterally, similar to prior. No highly suspicious nodule. No other abnormal opacities. PLEURA: Normal, with no effusion or pneumothorax. AIRWAY: Scattered foci of bronchial mucoid impaction, most pronounced in the medial basal left lower lobe. MEDIASTINUM: Normal heart size. No pericardial fluid. Scattered coronary and aortic calcifications. LYMPH NODES: No enlarged lymph nodes. IMPRESSION:  
· Abnormal Ct scan of chest- several subsolid nodules in the right lower lobe. Previously noted as well. · Chronic persistent cough - impacted mucus. · Cough Variant Asthma- with mild intermittent well controlled symptoms. Her NHL and therapy can induced her cough /bronchits thresh old but I see no evidence of atypical infections  . 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 , ? Allergic ( 9.8% eosinophilia on CBC) · Abnormal PFT\"s with obstructive airways defect and reduced DLCO- ? Related to previous chemotherapy. · Non hodgkins lymphoma-,2000,Stage IV, CVP 11/14/2008 on maintenance Rituxiban · GERD 
   
  
RECOMMENDATIONS:  
· Needs intensified airway clearance- will add aerobika: given with instructions for use · Hydration- increased po intake and humidify ambient air · Will treat for acute bronchitis- Bactrim DS BID x10 days and guafenesin · continue Singulair , Advair - will increase to 500/50 and prn albuterol · Avoid cough suppressants · Discussed PFT findings- progressive declining DLCO- will monitor · Discussed CT findings-likely inflammatory-mucus plugging related. and negative sputum AFB findings and reassured. Will order follow up CT in 3 months. · GERD control · Monitor for super infections ( on rituxiban) · Follow symptoms and aim for quality of life · Follow up in  3  Months. Health maintenance screens deferred to Primary care provider. Muriel Juan MD 
 
 
 
 
 
 
If you have questions, please do not hesitate to call me. I look forward to following Ms. Carlos along with you.  
 
 
 
Sincerely, 
 
 
Muriel Juan MD

## 2018-03-08 ENCOUNTER — TELEPHONE (OUTPATIENT)
Dept: PULMONOLOGY | Age: 78
End: 2018-03-08

## 2018-03-08 NOTE — TELEPHONE ENCOUNTER
PT [facility]([phone]). NEEDS ORDER/SCRIPT FOR CT  SENT TO [facility]. PLEASE CHECK AND CALL HER BACK.

## 2018-03-26 ENCOUNTER — DOCUMENTATION ONLY (OUTPATIENT)
Dept: PULMONOLOGY | Age: 78
End: 2018-03-26

## 2018-03-26 NOTE — TELEPHONE ENCOUNTER
Order re-faxed to [facility] Scheduling department. Called patient and notified.

## 2018-05-10 DIAGNOSIS — R91.8 LUNG NODULE, MULTIPLE: ICD-10-CM

## 2018-12-04 ENCOUNTER — OFFICE VISIT (OUTPATIENT)
Dept: ORTHOPEDIC SURGERY | Age: 78
End: 2018-12-04

## 2018-12-04 VITALS
BODY MASS INDEX: 22.92 KG/M2 | HEART RATE: 106 BPM | OXYGEN SATURATION: 98 % | WEIGHT: 142.6 LBS | SYSTOLIC BLOOD PRESSURE: 137 MMHG | HEIGHT: 66 IN | TEMPERATURE: 95.7 F | RESPIRATION RATE: 16 BRPM | DIASTOLIC BLOOD PRESSURE: 84 MMHG

## 2018-12-04 DIAGNOSIS — S73.102A SPRAIN OF LEFT HIP, INITIAL ENCOUNTER: ICD-10-CM

## 2018-12-04 DIAGNOSIS — S43.402A SPRAIN OF LEFT SHOULDER, UNSPECIFIED SHOULDER SPRAIN TYPE, INITIAL ENCOUNTER: Primary | ICD-10-CM

## 2018-12-04 RX ORDER — IBUPROFEN 600 MG/1
TABLET ORAL
COMMUNITY

## 2018-12-04 RX ORDER — ONDANSETRON 4 MG/1
4 TABLET, ORALLY DISINTEGRATING ORAL
COMMUNITY
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
## 2018-12-07 ENCOUNTER — HOSPITAL ENCOUNTER (OUTPATIENT)
Dept: PHYSICAL THERAPY | Age: 78
Discharge: HOME | End: 2018-12-07
Payer: MEDICARE

## 2018-12-07 PROCEDURE — G8979 MOBILITY GOAL STATUS: HCPCS

## 2018-12-07 PROCEDURE — G8978 MOBILITY CURRENT STATUS: HCPCS

## 2018-12-07 PROCEDURE — 97162 PT EVAL MOD COMPLEX 30 MIN: CPT

## 2018-12-07 PROCEDURE — 97110 THERAPEUTIC EXERCISES: CPT
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
## 2018-12-11 ENCOUNTER — HOSPITAL ENCOUNTER (OUTPATIENT)
Dept: PHYSICAL THERAPY | Age: 78
Discharge: HOME | End: 2018-12-11
Payer: MEDICARE

## 2018-12-11 PROCEDURE — 97140 MANUAL THERAPY 1/> REGIONS: CPT

## 2018-12-11 PROCEDURE — 97110 THERAPEUTIC EXERCISES: CPT

## 2018-12-11 NOTE — PROGRESS NOTES
PT DAILY TREATMENT NOTE 10-18    Patient Name: Sandra Erickson  Date:2018  : 1940  [x]  Patient  Verified  Payor: [place] MEDICARE / Plan: VA MEDICARE PART A & B / Product Type: Medicare /    In time:11:30  Out time:12:10  Total Treatment Time (min): 40  Visit #: 2 of 10           Medicare/BCBS Only   Total Timed Codes (min):  40 1:1 Treatment Time:  40        Treatment Area: Left shoulder pain [M25.512]  Low back pain [M54.5]    SUBJECTIVE  Pain Level (0-10 scale): 9/10 (shoulder/low back/hip)  Any medication changes, allergies to medications, adverse drug reactions, diagnosis change, or new procedure performed?: [x] No    [] Yes (see summary sheet for update)  Subjective functional status/changes:   [] No changes reported  \"Today I had to park so far away and walk, so my pain has increased\". OBJECTIVE    32 min Therapeutic Exercise:  [x] See flow sheet :   Rationale: increase ROM, increase strength and improve coordination to improve the patients ability to perform ADLs with improved ease. 8 min Manual Therapy:  STM t/s and l/s   Rationale: decrease pain, increase ROM and increase tissue extensibility to improve patients functional mobility. With   [x] TE   [] TA   [] neuro   [] other: Patient Education: [x] Review HEP    [] Progressed/Changed HEP based on:   [x] positioning   [] body mechanics   [] transfers   [] heat/ice application    [] other:      Other Objective/Functional Measures: Patient with improved gait pattern utilizing 4WW. Pain Level (0-10 scale) post treatment: 6    ASSESSMENT/Changes in Function: Patient progressed through exercises with a decrease in overall symptoms. Patient demonstrates slowed movement patterns secondary to pain. Hip 3 way held today secondary to patient with increased pain with LE weightbearing activities.       Patient will continue to benefit from skilled PT services to modify and progress therapeutic interventions, address functional mobility deficits, address ROM deficits, address strength deficits, analyze and address soft tissue restrictions, analyze and cue movement patterns, analyze and modify body mechanics/ergonomics and assess and modify postural abnormalities to attain remaining goals. [x]  See Plan of Care  []  See progress note/recertification  []  See Discharge Summary         Progress towards goals / Updated goals:    Short Term Goals: To be accomplished in 2 weeks:              0. YGKGDSX will be independent and compliant with HEP 2x/day in order to improve functional mobility. Goal met (12/11/18) per patient report.                1. Patient will be able to decrease pain to no more than 5/10 in order to perform ADLs. Long Term Goals: To be accomplished in 5 weeks:              3. NHSLZTW will be able to improve FOTO score to 47 in order to demonstrate improvements in functional independence.               2. Patient will be able to carry groceries with no limitations in order to improve functional independence.               3. Patient will be able to get in and out of bed in order to improve functional mobility. [id number]. Patient will be able to improve left shoulder ER to 50 degrees in order to improve functional mobility.             2. Patient will be able to improve B/L glute med strength to 4/5 in order to improve ADLs.       PLAN  []  Upgrade activities as tolerated     [x]  Continue plan of care  []  Update interventions per flow sheet       []  Discharge due to:_  []  Other:_      Cody Frazier, PT 12/11/2018  11:40 AM    Future Appointments   Date Time Provider Mariah Lamar   12/14/2018 10:30 AM Valentina Natarajan PT [facility]   12/19/2018 11:30 AM Valentina Natarajan PT [facility]   12/21/2018 12:00 PM Valentina Natarajan PT [facility]   12/26/2018 11:00 AM Randene Leventhal, PT [facility]   12/28/2018 12:30 PM Valentina Natarajan, PT [facility]   12/31/2018 12:00 PM Homar Sheth, PT [facility]   1/2/2019 12:30 PM Florina Santos, D[place]TON [facility]   1/3/2019 11:10 AM Butch Spencer [place] [id number] [facility]   1/8/2019 11:30 AM Florina Santos, PT [facility]

## 2018-12-14 ENCOUNTER — HOSPITAL ENCOUNTER (OUTPATIENT)
Dept: PHYSICAL THERAPY | Age: 78
Discharge: HOME | End: 2018-12-14
Payer: MEDICARE

## 2018-12-14 PROCEDURE — 97110 THERAPEUTIC EXERCISES: CPT

## 2018-12-14 PROCEDURE — 97140 MANUAL THERAPY 1/> REGIONS: CPT

## 2018-12-14 NOTE — PROGRESS NOTES
PT DAILY TREATMENT NOTE 10-18    Patient Name: Ryan Dickens  Date:2018  : 1940  [x]  Patient  Verified  Payor: [place] MEDICARE / Plan: VA MEDICARE PART A & B / Product Type: Medicare /    In time:12:27  Out time:11:10   Total Treatment Time (min): 43  Visit #: 3 of 10    Medicare/BCBS Only   Total Timed Codes (min):  43 1:1 Treatment Time:  41       Treatment Area: Left shoulder pain [M25.512]  Low back pain [M54.5]    SUBJECTIVE  Pain Level (0-10 scale): 9  Any medication changes, allergies to medications, adverse drug reactions, diagnosis change, or new procedure performed?: [x] No    [] Yes (see summary sheet for update)  Subjective functional status/changes:   [] No changes reported  \"I felt much better after I left here Tuesday, but now I am having a lot of pain again\". OBJECTIVE    31 min Therapeutic Exercise:  [x] See flow sheet :   Rationale: increase ROM and increase strength to improve the patients ability to perform ADLs with improved mobility and decreased pain. 10 min Manual Therapy:  Left shoulder PROM, t/s and l/s PA and UPA glides, STM paraspinals   Rationale: decrease pain, increase ROM and increase tissue extensibility to improve patients functional mobility. With   [x] TE   [] TA   [] neuro   [] other: Patient Education: [x] Review HEP    [] Progressed/Changed HEP based on:   [x] positioning   [] body mechanics   [] transfers   [] heat/ice application    [] other:         Pain Level (0-10 scale) post treatment: 6.5    ASSESSMENT/Changes in Function: Patient demonstrated all exercises today with out increase in symptoms and improvements in shoulder ROM noted throughout exercises. Hip 3-way added today, patient demonstrating improved weightbearing tolerance.      Patient will continue to benefit from skilled PT services to modify and progress therapeutic interventions, address functional mobility deficits, address ROM deficits, address strength deficits, analyze and address soft tissue restrictions, analyze and cue movement patterns, analyze and modify body mechanics/ergonomics and assess and modify postural abnormalities to attain remaining goals. [x]  See Plan of Care  []  See progress note/recertification  []  See Discharge Summary         Progress towards goals / Updated goals:  Short Term Goals: To be accomplished in 2 weeks:              1. ONWPEYX will be independent and compliant with HEP 2x/day in order to improve functional mobility. Goal met (12/11/18) per patient report.                8. Patient will be able to decrease pain to no more than 5/10 in order to perform ADLs. Long Term Goals: To be accomplished in 5 weeks:              1. QTIBCOO will be able to improve FOTO score to 47 in order to demonstrate improvements in functional independence.               2. Patient will be able to carry groceries with no limitations in order to improve functional independence.               3. Patient will be able to get in and out of bed in order to improve functional mobility. [id number]. Patient will be able to improve left shoulder ER to 50 degrees in order to improve functional mobility.             6. Patient will be able to improve B/L glute med strength to 4/5 in order to improve ADLs.     PLAN  []  Upgrade activities as tolerated     [x]  Continue plan of care  []  Update interventions per flow sheet       []  Discharge due to:_  []  Other:_      Norma Montaño PT 12/14/2018  10:29 AM    Future Appointments   Date Time Provider Mariah Lamar   12/14/2018 10:30 AM Jackie Franciscoer, PT [facility]   12/19/2018 11:30 AM Jackie Franciscoer, PT [facility]   12/20/2018 11:30 AM Jackie Franciscoer, PT [facility]   12/26/2018 11:00 AM Joshua Vargas, PT [facility]   12/28/2018 12:30 PM Jackie Franciscoer, PT [facility]   12/31/2018 12:00 PM Joshua Vargas, PT [facility]   1/2/2019 12:30 PM Jackie Franciscoer, PT [facility]   1/3/2019 11:10 AM MD Hailey Ramirez [address]   1/8/2019 11:30 AM Radha Vasquez PT [facility]

## 2018-12-19 ENCOUNTER — HOSPITAL ENCOUNTER (OUTPATIENT)
Dept: PHYSICAL THERAPY | Age: 78
Discharge: HOME | End: 2018-12-19
Payer: MEDICARE

## 2018-12-19 PROCEDURE — 97140 MANUAL THERAPY 1/> REGIONS: CPT

## 2018-12-19 PROCEDURE — 97110 THERAPEUTIC EXERCISES: CPT

## 2018-12-19 NOTE — PROGRESS NOTES
PT DAILY TREATMENT NOTE 10-18    Patient Name: Frankie Perea  Date:2018  : 1940  [x]  Patient  Verified  Payor: [place] MEDICARE / Plan: [place] MEDICARE PART A & B / Product Type: Medicare /    In time:11:26  Out time:12:04  Total Treatment Time (min): 38  Visit #: 4 of 10    Medicare/BCBS Only   Total Timed Codes (min):  38 1:1 Treatment Time:  38       Treatment Area: Left shoulder pain [M25.512]  Low back pain [M54.5]    SUBJECTIVE  Pain Level (0-10 scale): 8  Any medication changes, allergies to medications, adverse drug reactions, diagnosis change, or new procedure performed?: [x] No    [] Yes (see summary sheet for update)  Subjective functional status/changes:   [] No changes reported  \"I am getting some relief of pain, but it doesn't last long\". OBJECTIVE    28 min Therapeutic Exercise:  [x] See flow sheet :   Rationale: increase ROM and increase strength to improve the patients ability to perform ADLs. 10 min Manual Therapy:  PROM left shoulder, t/s and l/s mobs, STM paraspinals   Rationale: decrease pain, increase ROM and increase tissue extensibility to improve patients functional mobility. With   [x] TE   [] TA   [] neuro   [] other: Patient Education: [x] Review HEP    [] Progressed/Changed HEP based on:   [] positioning   [] body mechanics   [] transfers   [] heat/ice application    [] other:      Other Objective/Functional Measures: Improvements with sit to stand noted today. Pain Level (0-10 scale) post treatment: 7    ASSESSMENT/Changes in Function: Patient performs all exercises today with decrease in symptoms post session. Improvements with ease of sit to stand noted today. Patients demonstrates difficulty activating scapular stabilizers today secondary to pain and fatigue requiring moderate tactile and verbal cueing.      Patient will continue to benefit from skilled PT services to modify and progress therapeutic interventions, address functional mobility deficits, address ROM deficits, address strength deficits, analyze and address soft tissue restrictions, analyze and cue movement patterns, analyze and modify body mechanics/ergonomics and assess and modify postural abnormalities to attain remaining goals. [x]  See Plan of Care  []  See progress note/recertification  []  See Discharge Summary         Progress towards goals / Updated goals:  Short Term Goals: To be accomplished in 2 weeks:              0. TORIMZ will be independent and compliant with HEP 2x/day in order to improve functional mobility. Goal met (12/11/18) per patient report.                2. Patient will be able to decrease pain to no more than 5/10 in order to perform ADLs. Progressing (7/10) 12/19/18  Long Term Goals: To be accomplished in 5 weeks:              2. PAUL will be able to improve FOTO score to 47 in order to demonstrate improvements in functional independence.               2. Patient will be able to carry groceries with no limitations in order to improve functional independence.               3. Patient will be able to get in and out of bed in order to improve functional mobility. Progressing 12/19/18 per patient report  [id number]. Patient will be able to improve left shoulder ER to 50 degrees in order to improve functional mobility.             8. Patient will be able to improve B/L glute med strength to 4/5 in order to improve ADLs.     PLAN  []  Upgrade activities as tolerated     [x]  Continue plan of care  []  Update interventions per flow sheet       []  Discharge due to:_  []  Other:_      Jt Cast, PT 12/19/2018  11:29 AM    Future Appointments   Date Time Provider Mariah Lamar   12/19/2018 11:30 AM Mack Blackman PT [facility]   12/20/2018 11:30 AM Mack Blackman PT [facility]   12/26/2018 11:00 AM Mindy Stanton PT [facility]   12/28/2018 12:30 PM Mack Blackman PT [facility]   12/31/2018 12:00 PM Jay Rolando Archer, PT [facility]   1/2/2019 12:30 PM Rachel Barnes, PT [facility]   1/3/2019 11:10 AM Army [place] [id number] [facility]   1/8/2019 11:30 AM Rachel Barnes, PT [facility]

## 2018-12-20 ENCOUNTER — HOSPITAL ENCOUNTER (OUTPATIENT)
Dept: PHYSICAL THERAPY | Age: 78
Discharge: HOME | End: 2018-12-20
Payer: MEDICARE

## 2018-12-20 PROCEDURE — 97110 THERAPEUTIC EXERCISES: CPT

## 2018-12-20 PROCEDURE — 97140 MANUAL THERAPY 1/> REGIONS: CPT

## 2018-12-20 NOTE — PROGRESS NOTES
PT DAILY TREATMENT NOTE 10-18    Patient Name: Karlie Matos  Date:2018  : 1940  [x]  Patient  Verified  Payor: [place] MEDICARE / Plan: [place] MEDICARE PART A & B / Product Type: Medicare /    In time:11:22  Out time:12:07  Total Treatment Time (min): 45  Visit #: 5 of 10    Medicare/BCBS Only   Total Timed Codes (min):  45 1:1 Treatment Time:  45       Treatment Area: Left shoulder pain [M25.512]  Low back pain [M54.5]    SUBJECTIVE  Pain Level (0-10 scale): 9  Any medication changes, allergies to medications, adverse drug reactions, diagnosis change, or new procedure performed?: [x] No    [] Yes (see summary sheet for update)  Subjective functional status/changes:   [] No changes reported  \"My right knee has started hurting me now and last night I could barely walk on it\". OBJECTIVE      35 min Therapeutic Exercise:  [x] See flow sheet :   Rationale: increase ROM and increase strength to improve the patients ability to perform ADLs with improved mobility. 10 min Manual Therapy:  T/s and l/s mobs, STM paraspinals and QL, Shoulder PROM (flex, abd, IR, ER)   Rationale: decrease pain, increase ROM and increase tissue extensibility to improve patients functional mobility. With   [x] TE   [] TA   [] neuro   [] other: Patient Education: [x] Review HEP    [] Progressed/Changed HEP based on:   [x] positioning   [x] body mechanics   [] transfers   [] heat/ice application    [] other:      Other Objective/Functional Measures: Clamshells and LAQ added today. Shoulder AROM - Flex - 99 degrees, IR - 40 degrees, ER - 28 degrees     Pain Level (0-10 scale) post treatment: 7    ASSESSMENT/Changes in Function: Patient demonstrates decreased standing tolerance today due to right knee pain with WB, Hip 3-way held today. Patient is continuing to have constant pain in the shoulder, back, and hip, however is demonstrating improvements in Shoulder AROM.  Patient demonstrates increased muscle guarding with PROM. Patient will continue to benefit from skilled PT services to modify and progress therapeutic interventions, address functional mobility deficits, address ROM deficits, address strength deficits, analyze and address soft tissue restrictions, analyze and cue movement patterns, analyze and modify body mechanics/ergonomics and assess and modify postural abnormalities to attain remaining goals. [x]  See Plan of Care  []  See progress note/recertification  []  See Discharge Summary         Progress towards goals / Updated goals:  Short Term Goals: To be accomplished in 2 weeks:              3. CUKCJHX will be independent and compliant with HEP 2x/day in order to improve functional mobility. Goal met (12/11/18) per patient report.                3. Patient will be able to decrease pain to no more than 5/10 in order to perform ADLs. Progressing (7/10) 12/19/18  Long Term Goals: To be accomplished in 5 weeks:              2. ECRWOMW will be able to improve FOTO score to 47 in order to demonstrate improvements in functional independence.               2. Patient will be able to carry groceries with no limitations in order to improve functional independence.               3. Patient will be able to get in and out of bed in order to improve functional mobility. Progressing 12/19/18 per patient report  [id number]. Patient will be able to improve left shoulder ER to 50 degrees in order to improve functional mobility. Progressing 12/20/18 Right AROM shoulder ER - 28 degrees              5. Patient will be able to improve B/L glute med strength to 4/5 in order to improve ADLs.      PLAN  []  Upgrade activities as tolerated     [x]  Continue plan of care  []  Update interventions per flow sheet       []  Discharge due to:_  []  Other:_      Florinda Hutchison PT 12/20/2018  11:24 AM    Future Appointments   Date Time Provider Mariah Lamar   12/20/2018 11:30 AM Hesham Chowdhury, PT [facility] 12/26/2018 11:00 AM George Thompson, PT [facility]   12/28/2018 12:30 PM Alan Liu, PT [facility]   12/31/2018 12:00 PM Aubrey Nixon, PT [facility]   1/2/2019 12:30 PM Alan Liu, PT [facility]   1/3/2019 11:10 AM Lico Willis MD [facility]   1/8/2019 11:30 AM Alan Liu, PT [facility]

## 2018-12-21 ENCOUNTER — APPOINTMENT (OUTPATIENT)
Dept: PHYSICAL THERAPY | Age: 78
End: 2018-12-21
Payer: MEDICARE

## 2018-12-26 ENCOUNTER — HOSPITAL ENCOUNTER (OUTPATIENT)
Dept: PHYSICAL THERAPY | Age: 78
Discharge: HOME | End: 2018-12-26
Payer: MEDICARE

## 2018-12-26 PROCEDURE — 97110 THERAPEUTIC EXERCISES: CPT

## 2018-12-26 NOTE — PROGRESS NOTES
PT DAILY TREATMENT NOTE 10-18    Patient Name: Simuel Boxer  Date:2018  : 1940  [x]  Patient  Verified  Payor: [place] MEDICARE / Plan: [place] MEDICARE PART A & B / Product Type: Medicare /    In time:11:00  Out time:11:41  Total Treatment Time (min): 41  Visit #: 6 of 8    Medicare/BCBS Only   Total Timed Codes (min):  41 1:1 Treatment Time:  25       Treatment Area: Left shoulder pain [M25.512]  Low back pain [M54.5]    SUBJECTIVE  Pain Level (0-10 scale): 8.5/10  Any medication changes, allergies to medications, adverse drug reactions, diagnosis change, or new procedure performed?: [x] No    [] Yes (see summary sheet for update)  Subjective functional status/changes:   [] No changes reported  The patient reports that over the past few weeks she has had difficulty of with her right knee. Last night during radha dinner she attempted to get up and felt that it was giving way. She also indicates increased pain behind her left knee pain. \"I haven't even been able to sweep the floor. I tried it and couldn't do it. \" She states her left hip continues to bother her. Additionally, she states that her back might be worse, limiting her ability to stand and walk. She indicates that her left shoulder may be a little better. OBJECTIVE  41 min Therapeutic Exercise:  [x] See flow sheet :   Rationale: increase ROM and increase strength to improve the patients ability to improve ADL ease. With   [] TE   [] TA   [] neuro   [] other: Patient Education: [x] Review HEP    [] Progressed/Changed HEP based on:   [] positioning   [] body mechanics   [] transfers   [] heat/ice application    [] other:      Other Objective/Functional Measures: Added SB lumbar flexion exercise, and DKC to address limitations in lumbar mobility, poor lumbar mobility appreciated with both DKC as well as SB exercise.      Pain Level (0-10 scale) post treatment: 7/10    ASSESSMENT/Changes in Function: Progress mobility as tolerated with encouragement to improve trunk and hip mobility. The patient indicates her back might be worse, though shoulder subjective improvement is reported. She does indicate slight improve ease in bed mobility. Continue with emphasis of trunk mobility for improving ease of ADLs and standing/transfers. Patient will continue to benefit from skilled PT services to modify and progress therapeutic interventions, address functional mobility deficits, address ROM deficits, address strength deficits, analyze and address soft tissue restrictions, analyze and cue movement patterns, analyze and modify body mechanics/ergonomics, assess and modify postural abnormalities and instruct in home and community integration to attain remaining goals. []  See Plan of Care  []  See progress note/recertification  []  See Discharge Summary         Progress towards goals / Updated goals:  Short Term Goals: To be accomplished in 2 weeks:              1. KQXFNZJ will be independent and compliant with HEP 2x/day in order to improve functional mobility. Goal met (12/11/18) per patient report.                8. Patient will be able to decrease pain to no more than 5/10 in order to perform ADLs. Progressing (7/10) 12/19/18  Long Term Goals: To be accomplished in 5 weeks:              6. LVPYNFQ will be able to improve FOTO score to 47 in order to demonstrate improvements in functional independence.               2. Patient will be able to carry groceries with no limitations in order to improve functional independence.               3. Patient will be able to get in and out of bed in order to improve functional mobility. Progressing 12/19/18 per patient report  [id number]. Patient will be able to improve left shoulder ER to 50 degrees in order to improve functional mobility.  Progressing 12/20/18 Right AROM shoulder ER - 28 degrees              5. Patient will be able to improve B/L glute med strength to 4/5 in order to improve ADLs.     PLN  []  Upgrade activities as tolerated     [x]  Continue plan of care  []  Update interventions per flow sheet       []  Discharge due to:_  []  Other:_      Nickie Sun PT 12/26/2018  11:19 AM    Future Appointments   Date Time Provider Mariah Lamar   12/28/2018 12:30 PM Jose Lamb PT [facility]   12/31/2018 12:00 PM Faith Jha PT [facility]   1/2/2019 12:30 PM Jose Lamb PT [facility]   1/3/2019 11:10 AM MD [facility]   1/8/2019 11:30 AM Jose Lamb PT [facility]

## 2018-12-28 ENCOUNTER — HOSPITAL ENCOUNTER (OUTPATIENT)
Dept: PHYSICAL THERAPY | Age: 78
Discharge: HOME | End: 2018-12-28
Payer: MEDICARE

## 2018-12-28 PROCEDURE — 97110 THERAPEUTIC EXERCISES: CPT

## 2018-12-28 NOTE — PROGRESS NOTES
PT DAILY TREATMENT NOTE 10-18 Patient Name: Jessica Span Date:2018 : 1940 [x]  Patient  Verified Payor: [place] MEDICARE / Plan: Rolando Joseph / Product Type: Medicare / In time:12:30  Out time:1:09 Total Treatment Time (min): 39 Visit #: 7 of 8 Medicare/BCBS Only Total Timed Codes (min):  39 1:1 Treatment Time:  31  
 
 
Treatment Area: Left shoulder pain [M25.512] Low back pain [M54.5] SUBJECTIVE Pain Level (0-10 scale): 8 Any medication changes, allergies to medications, adverse drug reactions, diagnosis change, or new procedure performed?: [x] No    [] Yes (see summary sheet for update) Subjective functional status/changes:   [] No changes reported Patient states she feels improvements, however contiues to report 8/10 pain in the left shoulder and low back. OBJECTIVE 31 min Therapeutic Exercise:  [x] See flow sheet :  
Rationale: increase ROM and increase strength to improve the patients ability to perform ADLs with improved mobility and decreased pain. With 
 [x] TE 
 [] TA 
 [] neuro 
 [] other: Patient Education: [x] Review HEP [] Progressed/Changed HEP based on:  
[x] positioning   [] body mechanics   [] transfers   [] heat/ice application   
[] other:   
 
Other Objective/Functional Measures: Patient progressed to standing marches today, limited hip flexion left leg noted Pain Level (0-10 scale) post treatment: 6.5 ASSESSMENT/Changes in Function: Patient continues to have increased pain levels in the left shoulder and low back. Patient performs all exercises today with decrease in symptoms post session. Improvements in standing tolerance noted today without complaints in increased pain. Limitations in trunk mobility noted during stability ball roll outs today.   
 
Patient will continue to benefit from skilled PT services to modify and progress therapeutic interventions, address functional mobility deficits, address ROM deficits, address strength deficits, analyze and address soft tissue restrictions, analyze and cue movement patterns, analyze and modify body mechanics/ergonomics and assess and modify postural abnormalities to attain remaining goals. [x]  See Plan of Care 
[]  See progress note/recertification 
[]  See Discharge Summary Progress towards goals / Updated goals: 
Short Term Goals: To be accomplished in 2 weeks: 
            6. IGLESIAFHJE will be independent and compliant with HEP 2x/day in order to improve functional mobility. Goal met (12/11/18) per patient report.   
            1. Patient will be able to decrease pain to no more than 5/10 in order to perform ADLs. Progressing (7/10) 12/19/18 Long Term Goals: To be accomplished in 5 weeks: 
            8. GECIEFX will be able to improve FOTO score to 47 in order to demonstrate improvements in functional independence.  
            2. Patient will be able to carry groceries with no limitations in order to improve functional independence.  
            3. Patient will be able to get in and out of bed in order to improve functional mobility. Progressing 12/19/18 per patient report 
[id number]. Patient will be able to improve left shoulder ER to 50 degrees in order to improve functional mobility. Progressing 12/20/18 Right AROM shoulder ER - 28 degrees             2. Patient will be able to improve B/L glute med strength to 4/5 in order to improve ADLs. PLAN 
[]  Upgrade activities as tolerated     [x]  Continue plan of care 
[]  Update interventions per flow sheet      
[]  Discharge due to:_ 
[]  Other:_ Brianna Quezada, PT 12/28/2018  12:55 PM 
 
Future Appointments Date Time Provider Mariah Lamar 12/31/2018 12:00 PM Siri Lyman, PT [facility]  
1/2/2019 12:30 PM Meryl Richter, PT [facility]  
1/3/2019 11:10 AM [place] [id number] [facility]  
 1/7/2019 11:00 AM Terese Kocher, PT [facility]

## 2018-12-31 ENCOUNTER — HOSPITAL ENCOUNTER (OUTPATIENT)
Dept: PHYSICAL THERAPY | Age: 78
Discharge: HOME | End: 2018-12-31
Payer: MEDICARE

## 2018-12-31 PROCEDURE — 97110 THERAPEUTIC EXERCISES: CPT

## 2018-12-31 NOTE — PROGRESS NOTES
PT DAILY TREATMENT NOTE 10-18 Patient Name: Lexii Farley Date:2018 : 1940 [x]  Patient  Verified Payor: [place] MEDICARE / Plan: Rolando Joseph / Product Type: Medicare / In time:12:00  Out time:12:33 Total Treatment Time (min): 33 Visit #: 8 of 8 Medicare/BCBS Only Total Timed Codes (min):  33 1:1 Treatment Time:  31  
 
 
Treatment Area: Left shoulder pain [M25.512] Low back pain [M54.5] SUBJECTIVE Pain Level (0-10 scale): 7 Any medication changes, allergies to medications, adverse drug reactions, diagnosis change, or new procedure performed?: [x] No    [] Yes (see summary sheet for update) Subjective functional status/changes:   [] No changes reported Patient reports noticeable improvements in left shoulder AROM, patient states the back is bothering her the most now. OBJECTIVE 31 min Therapeutic Exercise:  [x] See flow sheet :  
Rationale: increase ROM and increase strength to improve the patients ability to perform ADLs with improved mobility and decreased pain. With 
 [x] TE 
 [] TA 
 [] neuro 
 [] other: Patient Education: [x] Review HEP [] Progressed/Changed HEP based on:  
[x] positioning   [] body mechanics   [] transfers   [] heat/ice application   
[] other:   
 
Other Objective/Functional Measures: FOTO score - 44 Pain Level (0-10 scale) post treatment: 7 ASSESSMENT/Changes in Function: Emphasis placed on exercises for low back today since patient is experiencing more low back symptoms than left shoulder. Patient is showing improvements in overall quality of movement and AROM. FOTO score has improved from 29 to 44 demonstrating improvements in patients functional independence. Patient able to tolerate standing exercises today without increase in low back symptoms.     
 
Patient will continue to benefit from skilled PT services to modify and progress therapeutic interventions, address functional mobility deficits, address ROM deficits, address strength deficits, analyze and cue movement patterns, analyze and modify body mechanics/ergonomics and assess and modify postural abnormalities to attain remaining goals. [x]  See Plan of Care 
[]  See progress note/recertification 
[]  See Discharge Summary Progress towards goals / Updated goals: 
Short Term Goals: To be accomplished in 2 weeks: 
            1. MMNALNQ will be independent and compliant with HEP 2x/day in order to improve functional mobility. Goal met (12/11/18) per patient report.   
            2. Patient will be able to decrease pain to no more than 5/10 in order to perform ADLs. Progressing (7/10) 12/19/18 Long Term Goals: To be accomplished in 5 weeks: 
            6. EFXFRAT will be able to improve FOTO score to 47 in order to demonstrate improvements in functional independence. Progressing 12/31/18 FOTO score = 44  
            2. Patient will be able to carry groceries with no limitations in order to improve functional independence.  
            3. Patient will be able to get in and out of bed in order to improve functional mobility. Progressing 12/19/18 per patient report 
[id number]. Patient will be able to improve left shoulder ER to 50 degrees in order to improve functional mobility. Progressing 12/20/18 Right AROM shoulder ER - 28 degrees             9. Patient will be able to improve B/L glute med strength to 4/5 in order to improve ADLs.   
 
 
PLAN 
[]  Upgrade activities as tolerated     [x]  Continue plan of care 
[]  Update interventions per flow sheet      
[]  Discharge due to:_ 
[]  Other:_ Azra Soni PT 12/31/2018  12:02 PM 
 
Future Appointments Date Time Provider Mariah Lamar 1/2/2019 12:30 PM Ella Baires PT [facility]  
1/3/2019 11:10 AM MD Ha[facility] Lock 67  
[facility]2019 11:00 [facility]

## 2018-12-31 NOTE — PROGRESS NOTES
In [address]  Arin [place] [address] [place], [address] 
[phone] fax Medicare Progress Report Patient name: Sussy Coyne Start of Care: 18 Referral source: Han Li,* : 1940 Medical/Treatment Diagnosis: Left shoulder pain [M25.512] Low back pain [M54.5] Payor: Bryan Schwartz / Plan: Rolando De Leon johan / Product Type: Medicare /  Onset Date:18 Prior Hospitalization: see medical history Provider#: [id number] Medications: Verified on Patient Summary List   
Comorbidities: high blood pressure, cancer, low back pain Prior Level of Function:Independent with all ADLs and recreational activities. Visits from Start of Care: 8    Missed Visits: 0 Reporting Period: 18 to 18 Subjective Reports: Patient reports noticeable improvements in left shoulder AROM, patient states the back is bothering her the most now. Key functional changes: Patient is making progress toward established goals. Patient has showed improvements in overall quality of movement and quality of ambulation with rollator walker. Patients standing tolerance has improved without increase in symptoms. Short Term Goals: To be accomplished in 2 weeks: 
            4. AECVMJE will be independent and compliant with HEP 2x/day in order to improve functional mobility. Goal met (18) per patient report.   
            3. Patient will be able to decrease pain to no more than 5/10 in order to perform ADLs. Progressing (7/10) 18 Long Term Goals: To be accomplished in 5 weeks: 
            4. EANCGOG will be able to improve FOTO score to 47 in order to demonstrate improvements in functional independence. Progressing 18 FOTO score = 44  
            2. Patient will be able to carry groceries with no limitations in order to improve functional independence.             3. Patient will be able to get in and out of bed in order to improve functional mobility. Progressing 12/19/18 per patient report 
[id number]. Patient will be able to improve left shoulder ER to 50 degrees in order to improve functional mobility. Progressing 12/20/18 Right AROM shoulder ER - 28 degrees             1. Patient will be able to improve B/L glute med strength to 4/5 in order to improve ADLs.   
  
 
Problems/ barriers to goal attainment: Pain, decreased mobility. Assessment / Recommendations:Emphasis placed on exercises for low back today since patient is experiencing more low back symptoms than left shoulder. Patient is showing improvements in overall quality of movement and AROM. FOTO score has improved from 29 to 44 demonstrating improvements in patients functional independence. Patient able to tolerate standing exercises today without increase in low back symptoms. Patient will continue to benefit form strengthening and ROM exercises in order to improve mobility and ADL completion. Problem List: pain affecting function, decrease ROM, decrease strength, impaired gait/ balance, decrease ADL/ functional abilitiies, decrease activity tolerance, decrease flexibility/ joint mobility and decrease transfer abilities Treatment Plan: Therapeutic exercise, Therapeutic activities, Neuromuscular re-education, Physical agent/modality, Gait/balance training, Manual therapy, Patient education, Self Care training and Functional mobility training Updated Goals to be accomplished in 2 weeks: 1. Patient will be able to decrease pain to no more than 5/10 in order to perform ADLs. Progressing (7/10) 12/19/18 2. Patient will be able to improve FOTO score to 47 in order to demonstrate improvements in functional independence. Progressing 12/31/18 FOTO score = 44  
 3. Patient will be able to carry groceries with no limitations in order to improve functional independence.  4. Patient will be able to get in and out of bed in order to improve functional mobility. Progressing 12/19/18 per patient report 
 5. Patient will be able to improve left shoulder ER to 50 degrees in order to improve functional mobility. Progressing 12/20/18 Right AROM shoulder ER - 28 degrees 
 6. Patient will be able to improve B/L glute med strength to 4/5 in order to improve ADLs.   
 
Frequency / Duration: Patient to be seen 2 times per week for 2 weeks: 
 
G-Codes (GP) Mobility  Current  CL= 60-79%   Goal  CK= 40-59% The severity rating is based on clinical judgment and the FOTO score.  
 
Lexi Keys, PT 12/31/2018 12:04 PM

## 2019-01-02 ENCOUNTER — HOSPITAL ENCOUNTER (OUTPATIENT)
Dept: PHYSICAL THERAPY | Age: 79
Discharge: HOME | End: 2019-01-02
Payer: MEDICARE

## 2019-01-02 PROCEDURE — 97110 THERAPEUTIC EXERCISES: CPT

## 2019-01-02 NOTE — PROGRESS NOTES
PT DAILY TREATMENT NOTE 10-18 Patient Name: Azael Colón Date:2019 : 1940 [x]  Patient  Verified Payor: [place] MEDICARE / Plan: Rolando De Leon y / Product Type: Medicare / In time:12:31  Out time:1:03 Total Treatment Time (min): 32 Visit #: 1 of 4 Medicare/BCBS Only Total Timed Codes (min):  32 1:1 Treatment Time:  30 Treatment Area: Left shoulder pain [M25.512] Low back pain [M54.5] SUBJECTIVE Pain Level (0-10 scale): 6 Any medication changes, allergies to medications, adverse drug reactions, diagnosis change, or new procedure performed?: [x] No    [] Yes (see summary sheet for update) Subjective functional status/changes:   [] No changes reported \"My back pain is decreasing, yesterday I was at a 5\". OBJECTIVE 30 min Therapeutic Exercise:  [x] See flow sheet :  
Rationale: increase ROM and increase strength to improve the patients ability to perform ADLs with improved mobility and decreased pain. With 
 [x] TE 
 [] TA 
 [] neuro 
 [] other: Patient Education: [x] Review HEP [] Progressed/Changed HEP based on:  
[x] positioning   [] body mechanics   [] transfers   [] heat/ice application   
[] other:   
 
Other Objective/Functional Measures: Left hip flexion AROM - 92 degrees. Left shoulder flexion AROM - 109 degrees Pain Level (0-10 scale) post treatment: 5 
 
ASSESSMENT/Changes in Function: Patient demonstrates all exercises with decrease in symptoms post session. Improvements noted in left shoulder and hip AROM noted today. Improvements noted in bed mobility today and sit to stand transfers during exercises.   
 
Patient will continue to benefit from skilled PT services to modify and progress therapeutic interventions, address functional mobility deficits, address ROM deficits, address strength deficits, analyze and address soft tissue restrictions, analyze and cue movement patterns, analyze and modify body mechanics/ergonomics and assess and modify postural abnormalities to attain remaining goals. [x]  See Plan of Care 
[]  See progress note/recertification 
[]  See Discharge Summary Progress towards goals / Updated goals: 1. Patient will be able to decrease pain to no more than 5/10 in order to perform ADLs. Progressing (6/10) 1/2/19 2. Patient will be able to improve FOTO score to 47 in order to demonstrate improvements in functional independence. Progressing 12/31/18 FOTO score = 44  
 3. Patient will be able to carry groceries with no limitations in order to improve functional independence.  
 4. Patient will be able to get in and out of bed in order to improve functional mobility. Progressing 12/19/18 per patient report 
 5. Patient will be able to improve left shoulder ER to 50 degrees in order to improve functional mobility. Progressing 12/20/18 Right AROM shoulder ER - 28 degrees  
 6. Patient will be able to improve B/L glute med strength to 4/5 in order to improve ADLs.   
 
 
PLAN 
[]  Upgrade activities as tolerated     [x]  Continue plan of care 
[]  Update interventions per flow sheet      
[]  Discharge due to:_ 
[]  Other:_ Sherryle Bookbinder, [facility] 1/2/2019  12:38 PM 
 
Future Appointments Date Time Provider Mariah Lamar 1/3/2019 11:10 AM MD Gris Dominguez 75  
1/7/2019 11:00 [facility]  
1/11/2019  2:00 PM [facility]  
1/17/2019 10:00 AM [facility]

## 2019-01-03 ENCOUNTER — OFFICE VISIT (OUTPATIENT)
Dept: ORTHOPEDIC SURGERY | Age: 79
End: 2019-01-03

## 2019-01-03 VITALS
SYSTOLIC BLOOD PRESSURE: 154 MMHG | BODY MASS INDEX: 23.02 KG/M2 | TEMPERATURE: 97.2 F | RESPIRATION RATE: 16 BRPM | DIASTOLIC BLOOD PRESSURE: 79 MMHG | HEIGHT: 66 IN | HEART RATE: 66 BPM | OXYGEN SATURATION: 98 %

## 2019-01-03 DIAGNOSIS — S43.402D SPRAIN OF LEFT SHOULDER, UNSPECIFIED SHOULDER SPRAIN TYPE, SUBSEQUENT ENCOUNTER: Primary | ICD-10-CM

## 2019-01-03 DIAGNOSIS — S33.5XXA SPRAIN OF LOW BACK, INITIAL ENCOUNTER: ICD-10-CM

## 2019-01-03 RX ORDER — MELOXICAM 7.5 MG/1
7.5 TABLET ORAL DAILY
Qty: 60 TAB | Refills: 0 | Status: SHIPPED | OUTPATIENT
Start: 2019-01-03
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
## 2019-01-07 ENCOUNTER — HOSPITAL ENCOUNTER (OUTPATIENT)
Dept: PHYSICAL THERAPY | Age: 79
Discharge: HOME | End: 2019-01-07
Payer: MEDICARE

## 2019-01-07 PROCEDURE — 97110 THERAPEUTIC EXERCISES: CPT

## 2019-01-08 ENCOUNTER — APPOINTMENT (OUTPATIENT)
Dept: PHYSICAL THERAPY | Age: 79
End: 2019-01-08
Payer: MEDICARE

## 2019-01-11 ENCOUNTER — HOSPITAL ENCOUNTER (OUTPATIENT)
Dept: PHYSICAL THERAPY | Age: 79
Discharge: HOME | End: 2019-01-11
Payer: MEDICARE

## 2019-01-11 PROCEDURE — 97110 THERAPEUTIC EXERCISES: CPT

## 2019-01-11 NOTE — PROGRESS NOTES
PT DAILY TREATMENT NOTE 10-18 Patient Name: Mark Headings Date:2019 : 1940 [x]  Patient  Verified Payor: [place] MEDICARE / Plan: Rolando Joseph / Product Type: Medicare / In time:1:54  Out time:2:28 Total Treatment Time (min): 34 Visit #: 3 of 4 Medicare/BCBS Only Total Timed Codes (min):  34 1:1 Treatment Time:  34 Treatment Area: Left shoulder pain [M25.512] Low back pain [M54.5] SUBJECTIVE Pain Level (0-10 scale): 6 Any medication changes, allergies to medications, adverse drug reactions, diagnosis change, or new procedure performed?: [x] No    [] Yes (see summary sheet for update) Subjective functional status/changes:   [] No changes reported \"I feel okay, the back is still bothering me more. I am going to see a spine doctor, I just have to call and make the appointment\". OBJECTIVE 34 min Therapeutic Exercise:  [x] See flow sheet : + PA mobs, STM paraspinals Rationale: increase ROM and increase strength to improve the patients ability to perform ADLs with improved ease. With 
 [x] TE 
 [] TA 
 [] neuro 
 [] other: Patient Education: [x] Review HEP [] Progressed/Changed HEP based on:  
[x] positioning   [] body mechanics   [] transfers   [] heat/ice application   
[] other:   
 
Other Objective/Functional Measures: Added standing hamstring curls and openbooks today. Held lumbar flexion stretch today secondary to increase pain. Pain Level (0-10 scale) post treatment: 5 
 
ASSESSMENT/Changes in Function: Patient performs all progressions and new exercises with decrease in symptoms today. Patient demonstrates improvements in speed on UBE/bike today as well as improved quality of ambulation. Increased standing exercises performed today in order to improve standing tolerance. Patient still requires increased time in order to perform bed mobility noted during exercises. Improvements in B/L glute med MMT strength noted today. Patient will continue to benefit from skilled PT services to modify and progress therapeutic interventions, address functional mobility deficits, address ROM deficits, address strength deficits, analyze and address soft tissue restrictions, analyze and cue movement patterns and analyze and modify body mechanics/ergonomics to attain remaining goals. [x]  See Plan of Care 
[]  See progress note/recertification 
[]  See Discharge Summary Progress towards goals / Updated goals: 
Progress towards goals / Updated goals: 
1. Patient will be able to decrease pain to no more than 5/10 in order to perform ADLs. Progressing (6/10) 1/2/19 2. Patient will be able to improve FOTO score to 47 in order to demonstrate improvements in functional independence. Progressing 12/31/18 FOTO score = 44  
 3. Patient will be able to carry groceries with no limitations in order to improve functional independence.  
 4. Patient will be able to get in and out of bed in order to improve functional mobility. Progressing 12/19/18 per patient report 
 5. Patient will be able to improve left shoulder ER to 50 degrees in order to improve functional mobility. Progressing 12/20/18 Right AROM shoulder ER - 28 degrees  
 6. Patient will be able to improve B/L glute med strength to 4/5 in order to improve ADLs.  Progressing 1/11/19 - B/L glute med strength MMT 4-/5 PLAN 
[]  Upgrade activities as tolerated     [x]  Continue plan of care 
[]  Update interventions per flow sheet      
[]  Discharge due to:_ 
[]  Other:_ Armando Miller PT 1/11/2019  1:56 PM 
 
Future Appointments Date Time Provider Mariah Lamar 1/11/2019  2:00 [facility]  
1/17/2019 10:00 AM [facility]

## 2019-01-17 ENCOUNTER — HOSPITAL ENCOUNTER (OUTPATIENT)
Dept: PHYSICAL THERAPY | Age: 79
Discharge: HOME | End: 2019-01-17
Payer: MEDICARE

## 2019-01-17 PROCEDURE — 97110 THERAPEUTIC EXERCISES: CPT

## 2019-01-17 NOTE — PROGRESS NOTES
PT DISCHARGE DAILY NOTE AND [id number] Date:2019 Patient name: Joseph Chapman Start of Care: 18 Referral source: Doroteo Patino,* : 1940 Medical/Treatment Diagnosis: Left shoulder pain [M25.512] Low back pain [M54.5] Onset Date:18 Prior Hospitalization: see medical history Provider#: [id number] Medications: Verified on Patient Summary List   
Comorbidities: high blood pressure, cancer, low back painInde Prior Level of Function: Independent with all ADLs and recreational activities. Visits from Start of Care: 12    Missed Visits: 0 Reporting Period : 18 to 19 Date:2019 : 1940 [x]  Patient  Verified Payor: [place] MEDICARE / Plan: [address] [place] [address] / Product Type: Medicare / In time:10:00  Out time:10:32 Total Treatment Time (min): 32 Visit #: 4 of 4 Medicare/BCBS Only Total Timed Codes (min):  32 1:1 Treatment Time:  24 SUBJECTIVE Pain Level (0-10 scale): 7 Any medication changes, allergies to medications, adverse drug reactions, diagnosis change, or new procedure performed?: [x] No    [] Yes (see summary sheet for update) Subjective functional status/changes:   [] No changes reported Patient states she is extremely tired today, stating she received chemo on Monday and has been exhausted since. OBJECTIVE 24 min Therapeutic Exercise:  [x] See flow sheet :  
Rationale: increase ROM and increase strength to improve the patients ability to perform ADLs with improved ease. With 
 [x] TE 
 [] TA 
 [] neuro 
 [] other: Patient Education: [x] Review HEP [] Progressed/Changed HEP based on:  
[x] positioning   [] body mechanics   [] transfers   [] heat/ice application   
[] other:   
 
Other Objective/Functional Measures: 5 FOTO score - 47 B/L glute med strength 4/5 Pain Level (0-10 scale) post treatment: 5 Summary of Care: Patient has made great progress toward established goals and demonstrated improvements in quality of mobility. Patient has been able to tolerate more standing exercises than she did in the beginning of care. Patient has demonstrated improved AROM and decreased pain levels, however still reports 6/10 pain. Goal: Patient will be able to improve FOTO score to 47 in order to demonstrate improvements in functional independence. Status at last note/certification: Progressing Status at discharge: met Goal: Patient will be able to get in and out of bed in order to improve functional mobility. Status at last note/certification: Progressing Status at discharge: met Goal: Patient will be able to improve B/L glute med strength to 4/5 in order to improve ADLs.   
Status at last note/certification: Progressing Status at discharge: met Goal: Patient will be able to decrease pain to no more than 5/10 in order to perform ADLs. Status at last note/certification: Progressing Status at discharge: not met ASSESSMENT/Changes in Function: Standing exericses limited today secondary to patient with increased fatigue since her chemo session on Monday. Patient able to complete all exercises seated and supine with decrease in pain post session. Patient demonstrates improvements in bed mobility today noted during exercises and improvements in speed on stationary bike. Patient states she has noticed remarkable improvements since beginning of care and states her activities are getting easier to perform. Thank you for this referral! 
   
PLAN [x]Discontinue therapy: [x]Patient has reached or is progressing toward set goals []Patient is non-compliant or has abdicated 
    []Due to lack of appreciable progress towards set goals Sharyle Riddles, PT 1/17/2019  10:10 AM

## 2019-02-24 ENCOUNTER — HOSPITAL ENCOUNTER (OUTPATIENT)
Age: 79
Discharge: HOME | End: 2019-02-24
Attending: PHYSICAL MEDICINE & REHABILITATION
Payer: MEDICARE

## 2019-02-24 DIAGNOSIS — M54.50 LOWER BACK PAIN: ICD-10-CM

## 2019-02-24 PROCEDURE — 72148 MRI LUMBAR SPINE W/O DYE: CPT

## 2021-08-11 NOTE — PROGRESS NOTES
Ms. Trang Brito has a reminder for a \"due or due soon\" health maintenance. I have asked that she contact her primary care provider for follow-up on this health maintenance. Chief Complaint   Patient presents with    Cough with sputum     1. Have you been to the ER, urgent care clinic since your last visit? Hospitalized since your last visit? No    2. Have you seen or consulted any other health care providers outside of the [address] [place] [address] since your last visit? Include any pap smears or colon screening.  Yes When: 1/2018 Where: Dr Katelin Burrows  Reason for visit: follow-up Opt out